# Patient Record
Sex: MALE | Race: WHITE | Employment: OTHER | URBAN - METROPOLITAN AREA
[De-identification: names, ages, dates, MRNs, and addresses within clinical notes are randomized per-mention and may not be internally consistent; named-entity substitution may affect disease eponyms.]

---

## 2017-02-01 ENCOUNTER — TELEPHONE (OUTPATIENT)
Dept: FAMILY MEDICINE CLINIC | Age: 67
End: 2017-02-01

## 2017-03-31 ENCOUNTER — TELEPHONE (OUTPATIENT)
Dept: FAMILY MEDICINE CLINIC | Age: 67
End: 2017-03-31

## 2017-11-03 LAB
AVERAGE GLUCOSE: NORMAL
HBA1C MFR BLD: 7.6 %

## 2018-01-18 ENCOUNTER — HOSPITAL ENCOUNTER (OUTPATIENT)
Dept: ULTRASOUND IMAGING | Age: 68
Discharge: HOME | End: 2018-01-18
Attending: INTERNAL MEDICINE | Admitting: INTERNAL MEDICINE

## 2018-01-18 DIAGNOSIS — R10.11 ABDOMINAL PAIN, RIGHT UPPER QUADRANT: ICD-10-CM

## 2018-01-18 DIAGNOSIS — I73.9 PERIPHERAL VASCULAR DISEASE (HCC): ICD-10-CM

## 2018-02-26 ENCOUNTER — HOSPITAL ENCOUNTER (OUTPATIENT)
Dept: SURGERY | Age: 68
Discharge: HOME | End: 2018-02-26
Attending: OPHTHALMOLOGY | Admitting: OPHTHALMOLOGY

## 2018-02-26 VITALS
DIASTOLIC BLOOD PRESSURE: 100 MMHG | OXYGEN SATURATION: 97 % | SYSTOLIC BLOOD PRESSURE: 187 MMHG | BODY MASS INDEX: 42.84 KG/M2 | HEART RATE: 76 BPM | HEIGHT: 71 IN | TEMPERATURE: 98.4 F | RESPIRATION RATE: 16 BRPM | WEIGHT: 306 LBS

## 2018-02-26 DIAGNOSIS — H25.812 COMBINED FORMS OF AGE-RELATED CATARACT OF LEFT EYE: ICD-10-CM

## 2018-02-26 LAB
GLUCOSE BLD-MCNC: 223 MG/DL (ref 70–99)
PERFORMED ON: ABNORMAL

## 2018-02-26 RX ORDER — LABETALOL HYDROCHLORIDE 5 MG/ML
5 INJECTION, SOLUTION INTRAVENOUS EVERY 10 MIN PRN
Status: DISCONTINUED | OUTPATIENT
Start: 2018-02-26 | End: 2018-02-27 | Stop reason: HOSPADM

## 2018-02-26 RX ORDER — HYDRALAZINE HYDROCHLORIDE 20 MG/ML
5 INJECTION INTRAMUSCULAR; INTRAVENOUS EVERY 10 MIN PRN
Status: DISCONTINUED | OUTPATIENT
Start: 2018-02-26 | End: 2018-02-27 | Stop reason: HOSPADM

## 2018-02-26 RX ORDER — PROMETHAZINE HYDROCHLORIDE 25 MG/ML
6.25 INJECTION, SOLUTION INTRAMUSCULAR; INTRAVENOUS
Status: ACTIVE | OUTPATIENT
Start: 2018-02-26 | End: 2018-02-26

## 2018-02-26 RX ORDER — SODIUM CHLORIDE, SODIUM LACTATE, POTASSIUM CHLORIDE, CALCIUM CHLORIDE 600; 310; 30; 20 MG/100ML; MG/100ML; MG/100ML; MG/100ML
INJECTION, SOLUTION INTRAVENOUS CONTINUOUS
Status: DISCONTINUED | OUTPATIENT
Start: 2018-02-26 | End: 2018-02-27 | Stop reason: HOSPADM

## 2018-02-26 RX ORDER — SODIUM CHLORIDE 0.9 % (FLUSH) 0.9 %
10 SYRINGE (ML) INJECTION EVERY 12 HOURS SCHEDULED
Status: DISCONTINUED | OUTPATIENT
Start: 2018-02-26 | End: 2018-02-27 | Stop reason: HOSPADM

## 2018-02-26 RX ORDER — METOCLOPRAMIDE HYDROCHLORIDE 5 MG/ML
10 INJECTION INTRAMUSCULAR; INTRAVENOUS
Status: ACTIVE | OUTPATIENT
Start: 2018-02-26 | End: 2018-02-26

## 2018-02-26 RX ORDER — SODIUM CHLORIDE 0.9 % (FLUSH) 0.9 %
10 SYRINGE (ML) INJECTION PRN
Status: DISCONTINUED | OUTPATIENT
Start: 2018-02-26 | End: 2018-02-27 | Stop reason: HOSPADM

## 2018-02-26 RX ORDER — MEPERIDINE HYDROCHLORIDE 50 MG/ML
12.5 INJECTION INTRAMUSCULAR; INTRAVENOUS; SUBCUTANEOUS EVERY 5 MIN PRN
Status: DISCONTINUED | OUTPATIENT
Start: 2018-02-26 | End: 2018-02-27 | Stop reason: HOSPADM

## 2018-02-26 RX ORDER — MORPHINE SULFATE 2 MG/ML
1 INJECTION, SOLUTION INTRAMUSCULAR; INTRAVENOUS EVERY 5 MIN PRN
Status: DISCONTINUED | OUTPATIENT
Start: 2018-02-26 | End: 2018-02-27 | Stop reason: HOSPADM

## 2018-02-26 RX ORDER — OXYCODONE HYDROCHLORIDE 5 MG/1
10 TABLET ORAL PRN
Status: ACTIVE | OUTPATIENT
Start: 2018-02-26 | End: 2018-02-26

## 2018-02-26 RX ORDER — OXYCODONE HYDROCHLORIDE 5 MG/1
5 TABLET ORAL PRN
Status: ACTIVE | OUTPATIENT
Start: 2018-02-26 | End: 2018-02-26

## 2018-02-26 RX ORDER — LIDOCAINE HYDROCHLORIDE 10 MG/ML
0.3 INJECTION, SOLUTION EPIDURAL; INFILTRATION; INTRACAUDAL; PERINEURAL
Status: ACTIVE | OUTPATIENT
Start: 2018-02-26 | End: 2018-02-26

## 2018-02-26 RX ORDER — DIPHENHYDRAMINE HYDROCHLORIDE 50 MG/ML
12.5 INJECTION INTRAMUSCULAR; INTRAVENOUS
Status: ACTIVE | OUTPATIENT
Start: 2018-02-26 | End: 2018-02-26

## 2018-02-26 RX ADMIN — SODIUM CHLORIDE, SODIUM LACTATE, POTASSIUM CHLORIDE, CALCIUM CHLORIDE: 600; 310; 30; 20 INJECTION, SOLUTION INTRAVENOUS at 08:16

## 2018-02-26 ASSESSMENT — PAIN - FUNCTIONAL ASSESSMENT: PAIN_FUNCTIONAL_ASSESSMENT: 0-10

## 2018-02-26 ASSESSMENT — PAIN SCALES - GENERAL: PAINLEVEL_OUTOF10: 0

## 2018-02-26 NOTE — ANESTHESIA POST-OP
Postoperative Anesthesia Note    Name:    Ximena Morris  MRN:      [id number]    Patient Vitals for the past 12 hrs:   BP Temp Pulse Resp SpO2 Height Weight   02/26/18 0922 (!) 187/100 - 76 16 97 % - -   02/26/18 0746 (!) 180/86 98.4 °F (36.9 °C) 74 16 96 % 5' 11\" (1.803 m) (!) 306 lb (138.8 kg)        LABS:    CBC  Lab Results   Component Value Date/Time    WBC 12.1 (H) 10/29/2017 03:33 PM    HGB 16.4 10/29/2017 03:33 PM    HCT 50.3 10/29/2017 03:33 PM     10/29/2017 03:33 PM     RENAL  Lab Results   Component Value Date/Time     10/29/2017 03:33 PM    K 4.1 10/29/2017 03:33 PM    CL 94 (L) 10/29/2017 03:33 PM    CO2 29 10/29/2017 03:33 PM    BUN 18 10/29/2017 03:33 PM    CREATININE 1.7 (H) 10/29/2017 03:33 PM    GLUCOSE 133 (H) 10/29/2017 03:33 PM     COAGS  Lab Results   Component Value Date/Time    PROTIME 10.7 05/15/2015 07:50 AM    INR 0.99 05/15/2015 07:50 AM    APTT 33.3 05/05/2010 12:30 PM       Intake & Output: In: 510 [P.O.:360; I.V.:150]  Out: -     Nausea & Vomiting:  No    Level of Consciousness:  Awake    Pain Assessment:  Adequate analgesia    Anesthesia Complications:  No apparent anesthetic complications    SUMMARY      Vital signs stable  OK to discharge from Stage I post anesthesia care.   Care transferred from Anesthesiology department on discharge from perioperative area

## 2018-02-26 NOTE — ANESTHESIA PRE-OP
 ALPRAZolam (XANAX) 1 MG tablet TAKE ONE TABLET BY MOUTH TWICE A DAY AS NEEDED 60 tablet 1    glipiZIDE (GLUCOTROL) 10 MG tablet Take 1 tablet by mouth 2 times daily (before meals). 120 tablet 2    carvedilol (COREG) 12.5 MG tablet Take 12.5 mg by mouth 2 times daily (with meals).  clopidogrel (PLAVIX) 75 MG tablet Take 75 mg by mouth daily.  vitamin D (CHOLECALCIFEROL) 1000 units TABS tablet Take 1,000 Units by mouth daily 2 tablets      valproic acid (DEPAKENE) 250 MG capsule Take 250 mg by mouth 4 times daily      divalproex (DEPAKOTE) 250 MG DR tablet Take 250 mg by mouth 3 times daily      allopurinol (ZYLOPRIM) 300 MG tablet Take 1 tablet by mouth daily. 90 tablet 3    [employer] Lancets Thin MISC by Does not apply route. Test daily 100 each 5    aspirin 325 MG tablet Take 325 mg by mouth daily.        Current Facility-Administered Medications   Medication Dose Route Frequency Provider Last Rate Last Dose    lactated ringers infusion   Intravenous Continuous Ela Gonzalez MD        sodium chloride flush 0.9 % injection 10 mL  10 mL Intravenous 2 times per day Ela Gonzalez MD        sodium chloride flush 0.9 % injection 10 mL  10 mL Intravenous PRN Ela Gonzalez MD        lidocaine PF 1 % injection 0.3 mL  0.3 mL Intradermal Once PRN Ela Gonzalez MD        bupivacaine 0.75%, phenylephrine 10%, tropicamide 1%, cyclopentolate 1%, moxifloxacin 0.5%, ketorolac 0.5% in lidocaine 2% jelly ophthalmic solution  0.3 mL Left Eye Q10 Min PRN Jonathan Cifuentes MD   0.3 mL at 02/26/18 0750    HYDROmorphone (DILAUDID) injection 0.25 mg  0.25 mg Intravenous Q5 Min PRN Roya Humphrey MD        HYDROmorphone (DILAUDID) injection 0.5 mg  0.5 mg Intravenous Q5 Min PRN Roya Humphrey MD        morphine (PF) injection 1 mg  1 mg Intravenous Q5 Min PRN Roya Humphrey MD        HYDROmorphone (DILAUDID) injection 0.5 mg  0.5 mg Intravenous Q5 Min PRN Roya Humphrey MD        oxyCODONE (Belen Bigness) immediate release tablet 5 mg  5 mg Oral PRN Vernawale Suazo MD        Or    oxyCODONE (ROXICODONE) immediate release tablet 10 mg  10 mg Oral PRN Aramis Suazo MD        diphenhydrAMINE (BENADRYL) injection 12.5 mg  12.5 mg Intravenous Once PRN Aidennawale Suazo MD        metoclopramide (REGLAN) injection 10 mg  10 mg Intravenous Once PRN Aramis Suazo MD        promethazine (PHENERGAN) injection 6.25 mg  6.25 mg Intravenous Once PRN Vernadine MD Gabriele        labetalol (NORMODYNE;TRANDATE) injection 5 mg  5 mg Intravenous Q10 Min PRN Vernadine Gabriele, MD        hydrALAZINE (APRESOLINE) injection 5 mg  5 mg Intravenous Q10 Min PRN Vernadine Gabriele, MD        meperidine (DEMEROL) injection 12.5 mg  12.5 mg Intravenous Q5 Min PRN Aidennadine MD Gabriele        epinephrine and lidocaine 2% PF in BSS injection (1 mL)   Intraocular Once Dirk Jalloh MD           Allergies:     Allergies   Allergen Reactions    Bactrim     Dye [Iodides]     Lisinopril        Problem List:    Patient Active Problem List   Diagnosis Code    HTN (hypertension) I10    ASHD (arteriosclerotic heart disease) I25.10    TIA (transient ischemic attack) G45.9    CKD (chronic kidney disease) N18.9    Increased BMI R63.8    WINSTON (obstructive sleep apnea) G47.33    Hyperlipidemia E78.5    PVD (peripheral vascular disease) ([facility] I73.9    Diabetes mellitus ([facility] E11.9    Diabetes mellitus ([facility] E11.9    Asthma exacerbation in COPD ([facility] J44.1, J45.901    Colon cancer ([facility] C18.9    Depression F32.9       Past Medical History:        Diagnosis Date    Abdominal aneurysm ([facility]     Arthritis     ASHD (arteriosclerotic heart disease) 9/8/2010    Asthma exacerbation in COPD ([facility] 5/7/2012    CKD (chronic kidney disease) 9/8/2010    [facility] 12/12/2013    COPD [facility]chronic obstructive pulmonary disease) [facility]     Depression 12/12/2013    Diabetes mellitus (HCC)     Hyperlipidemia     Hypertension     WINSTON (obstructive Vascular:                                        Anesthesia Plan      general     ASA 1    (68-year-old male presents for phacoemulsification with intraocular lens implant of the left eye. Plan MAC with ASA standard monitors. Questions answered. Patient agreeable with anesthetic plan.  )  Induction: intravenous. Anesthetic plan and risks discussed with patient. Plan discussed with CRNA.     Attending anesthesiologist reviewed and agrees with Pre Eval content        Alejandro García MD   2/26/2018

## 2018-02-26 NOTE — OP NOTE
[facility]    OPERATIVE NOTE    Preoperative Diagnosis: Cataract, left eye    Postoperative Diagnosis: Cataract, left eye    Procedure: Phacoemulsification with intraocular lens inplantation, left eye    Surgeon: Jyothi Garsia    Anesthesia: MAC with topical    Complications: none    Specimens: none    Indications for procedure: The patient is a 69y.o. year old with decreased vision, glare and halos around lights, and trouble with activities of daily living. Examination revealed a visually significant cataract in the left eye. Risks, benefits, and alternatives to surgery were discussed with the patient and the patient elected to proceed with phacoemulsification with lens implantation. Details of the procedure: Following informed consent, the patient was taken to the operating room and placed in the supine position. The eye was prepped and draped in the usual sterile fashion using aseptic technique for cataract surgery. Following topical tetracaine drops a side port incision was made in the temporal cornea. The eye was filled with trypan and rinsed with a pharmacy prepared epi-Shugarcaine mixture. The eye was then filled with viscoelastic and a 2.4 mm keratome blade was used to make a 3-plane clear corneal incision in the temporal cornea. The cystitome was used to make a tear in the anterior capsule and a Utrata forceps was used to make a complete curvilinear capsulorrhexis. The lens was hydrodissected and freely rotated. Phacoemulsification was performed. Irrigation/aspiration was used to remove all cortical material from the capsular bag. The eye was filled with viscoelastic and a foldable posterior chamber intraocular lens was injected into the capsular bag. Irrigation/aspiration was used to remove all excess viscoelastic. The eye was pressurized and the wounds were checked for leaks and none were found. The patient had Vigamox and Alphagan solutions placed on the eye.   The eye was covered

## 2019-01-07 ENCOUNTER — APPOINTMENT (OUTPATIENT)
Dept: GENERAL RADIOLOGY | Age: 69
End: 2019-01-07
Payer: MEDICARE

## 2019-01-07 ENCOUNTER — HOSPITAL ENCOUNTER (EMERGENCY)
Age: 69
Discharge: HOME | End: 2019-01-07
Attending: EMERGENCY MEDICINE
Payer: MEDICARE

## 2019-01-07 VITALS
OXYGEN SATURATION: 95 % | TEMPERATURE: 98 F | SYSTOLIC BLOOD PRESSURE: 188 MMHG | BODY MASS INDEX: 38.5 KG/M2 | DIASTOLIC BLOOD PRESSURE: 123 MMHG | RESPIRATION RATE: 16 BRPM | WEIGHT: 275 LBS | HEIGHT: 71 IN | HEART RATE: 89 BPM
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
## 2019-01-07 DIAGNOSIS — S61.411A LACERATION OF RIGHT HAND WITHOUT FOREIGN BODY, INITIAL ENCOUNTER: Primary | ICD-10-CM

## 2019-01-07 LAB
A/G RATIO: 1 (ref 1.1–2.2)
ALBUMIN SERPL-MCNC: 3.7 G/DL (ref 3.4–5)
ALP BLD-CCNC: 244 U/L (ref 40–129)
ALT SERPL-CCNC: 21 U/L (ref 10–40)
ANION GAP SERPL CALCULATED.3IONS-SCNC: 12 MMOL/L (ref 3–16)
AST SERPL-CCNC: 23 U/L (ref 15–37)
BASOPHILS ABSOLUTE: 0.2 K/UL (ref 0–0.2)
BASOPHILS RELATIVE PERCENT: 1.3 %
BILIRUB SERPL-MCNC: 0.5 MG/DL (ref 0–1)
BILIRUBIN URINE: NEGATIVE
BLOOD, URINE: NEGATIVE
BUN BLDV-MCNC: 22 MG/DL (ref 7–20)
CALCIUM SERPL-MCNC: 9.9 MG/DL (ref 8.3–10.6)
CASTS: ABNORMAL /LPF
CHLORIDE BLD-SCNC: 93 MMOL/L (ref 99–110)
CLARITY: CLEAR
CO2: 28 MMOL/L (ref 21–32)
COLOR: YELLOW
CREAT SERPL-MCNC: 1.8 MG/DL (ref 0.8–1.3)
EOSINOPHILS ABSOLUTE: 0.3 K/UL (ref 0–0.6)
EOSINOPHILS RELATIVE PERCENT: 2.8 %
GFR AFRICAN AMERICAN: 45
GFR NON-AFRICAN AMERICAN: 37
GLOBULIN: 3.8 G/DL
GLUCOSE BLD-MCNC: 122 MG/DL (ref 70–99)
GLUCOSE URINE: NEGATIVE MG/DL
HCT VFR BLD CALC: 48.9 % (ref 40.5–52.5)
HEMOGLOBIN: 15.9 G/DL (ref 13.5–17.5)
KETONES, URINE: NEGATIVE MG/DL
LEUKOCYTE ESTERASE, URINE: NEGATIVE
LYMPHOCYTES ABSOLUTE: 1.9 K/UL (ref 1–5.1)
LYMPHOCYTES RELATIVE PERCENT: 16.6 %
MCH RBC QN AUTO: 28.1 PG (ref 26–34)
MCHC RBC AUTO-ENTMCNC: 32.6 G/DL (ref 31–36)
MCV RBC AUTO: 86.3 FL (ref 80–100)
MICROSCOPIC EXAMINATION: YES
MONOCYTES ABSOLUTE: 0.8 K/UL (ref 0–1.3)
MONOCYTES RELATIVE PERCENT: 7.2 %
MUCUS: ABNORMAL /LPF
NEUTROPHILS ABSOLUTE: 8.4 K/UL (ref 1.7–7.7)
NEUTROPHILS RELATIVE PERCENT: 72.1 %
NITRITE, URINE: NEGATIVE
PDW BLD-RTO: 15.9 % (ref 12.4–15.4)
PH UA: 6.5
PLATELET # BLD: 250 K/UL (ref 135–450)
PMV BLD AUTO: 8.7 FL (ref 5–10.5)
POTASSIUM REFLEX MAGNESIUM: 4 MMOL/L (ref 3.5–5.1)
PROTEIN UA: 100 MG/DL
RBC # BLD: 5.67 M/UL (ref 4.2–5.9)
RBC UA: ABNORMAL /HPF (ref 0–2)
SODIUM BLD-SCNC: 133 MMOL/L (ref 136–145)
SPECIFIC GRAVITY UA: 1.02
TOTAL PROTEIN: 7.5 G/DL (ref 6.4–8.2)
TROPONIN: <0.01 NG/ML
URINE REFLEX TO CULTURE: ABNORMAL
URINE TYPE: ABNORMAL
UROBILINOGEN, URINE: 0.2 E.U./DL
WBC # BLD: 11.7 K/UL (ref 4–11)
WBC UA: ABNORMAL /HPF (ref 0–5)

## 2019-01-07 PROCEDURE — 81001 URINALYSIS AUTO W/SCOPE: CPT

## 2019-01-07 PROCEDURE — 90715 TDAP VACCINE 7 YRS/> IM: CPT | Performed by: PHYSICIAN ASSISTANT

## 2019-01-07 PROCEDURE — 73130 X-RAY EXAM OF HAND: CPT

## 2019-01-07 PROCEDURE — 6360000002 HC RX W HCPCS: Performed by: PHYSICIAN ASSISTANT

## 2019-01-07 PROCEDURE — 80053 COMPREHEN METABOLIC PANEL: CPT

## 2019-01-07 PROCEDURE — 99284 EMERGENCY DEPT VISIT MOD MDM: CPT

## 2019-01-07 PROCEDURE — 85025 COMPLETE CBC W/AUTO DIFF WBC: CPT

## 2019-01-07 PROCEDURE — 4500000024 HC ED LEVEL 4 PROCEDURE

## 2019-01-07 PROCEDURE — 90471 IMMUNIZATION ADMIN: CPT | Performed by: PHYSICIAN ASSISTANT

## 2019-01-07 PROCEDURE — 93005 ELECTROCARDIOGRAM TRACING: CPT | Performed by: PHYSICIAN ASSISTANT

## 2019-01-07 PROCEDURE — 84484 ASSAY OF TROPONIN QUANT: CPT

## 2019-01-07 RX ADMIN — TETANUS TOXOID, REDUCED DIPHTHERIA TOXOID AND ACELLULAR PERTUSSIS VACCINE, ADSORBED 0.5 ML: 5; 2.5; 8; 8; 2.5 SUSPENSION INTRAMUSCULAR at 20:15

## 2019-01-07 ASSESSMENT — PAIN SCALES - GENERAL: PAINLEVEL_OUTOF10: 4

## 2019-01-07 ASSESSMENT — PAIN DESCRIPTION - ORIENTATION: ORIENTATION: LEFT

## 2019-01-07 ASSESSMENT — PAIN DESCRIPTION - LOCATION: LOCATION: RIB CAGE

## 2019-01-07 ASSESSMENT — ENCOUNTER SYMPTOMS
RESPIRATORY NEGATIVE: 1
GASTROINTESTINAL NEGATIVE: 1

## 2019-01-07 ASSESSMENT — PAIN DESCRIPTION - ONSET: ONSET: SUDDEN

## 2019-01-07 ASSESSMENT — PAIN DESCRIPTION - FREQUENCY: FREQUENCY: CONTINUOUS

## 2019-01-07 ASSESSMENT — PAIN DESCRIPTION - PAIN TYPE: TYPE: ACUTE PAIN

## 2019-01-07 ASSESSMENT — PAIN DESCRIPTION - DESCRIPTORS: DESCRIPTORS: ACHING

## 2019-01-08 LAB
EKG ATRIAL RATE: 99 BPM
EKG DIAGNOSIS: NORMAL
EKG P AXIS: 68 DEGREES
EKG P-R INTERVAL: 182 MS
EKG Q-T INTERVAL: 388 MS
EKG QRS DURATION: 116 MS
EKG QTC CALCULATION (BAZETT): 497 MS
EKG R AXIS: 92 DEGREES
EKG T AXIS: 54 DEGREES
EKG VENTRICULAR RATE: 99 BPM

## 2019-01-08 PROCEDURE — 93010 ELECTROCARDIOGRAM REPORT: CPT | Performed by: INTERNAL MEDICINE

## 2019-02-06 ENCOUNTER — HOSPITAL ENCOUNTER (EMERGENCY)
Age: 69
Discharge: HOME | End: 2019-02-06
Payer: MEDICARE

## 2019-02-06 VITALS
HEART RATE: 84 BPM | OXYGEN SATURATION: 94 % | SYSTOLIC BLOOD PRESSURE: 189 MMHG | TEMPERATURE: 97 F | RESPIRATION RATE: 16 BRPM | HEIGHT: 71 IN | BODY MASS INDEX: 41.44 KG/M2 | WEIGHT: 296 LBS | DIASTOLIC BLOOD PRESSURE: 100 MMHG

## 2019-02-06 DIAGNOSIS — L03.90 CELLULITIS, UNSPECIFIED CELLULITIS SITE: Primary | ICD-10-CM

## 2019-02-06 PROCEDURE — 6370000000 HC RX 637 (ALT 250 FOR IP): Performed by: PHYSICIAN ASSISTANT

## 2019-02-06 PROCEDURE — 99283 EMERGENCY DEPT VISIT LOW MDM: CPT

## 2019-02-06 RX ORDER — CEPHALEXIN 500 MG/1
500 CAPSULE ORAL ONCE
Status: COMPLETED | OUTPATIENT
Start: 2019-02-06 | End: 2019-02-06

## 2019-02-06 RX ORDER — CEPHALEXIN 500 MG/1
500 CAPSULE ORAL 4 TIMES DAILY
Qty: 40 CAPSULE | Refills: 0 | Status: SHIPPED | OUTPATIENT
Start: 2019-02-06 | End: 2019-02-16

## 2019-02-06 RX ORDER — CEPHALEXIN 500 MG/1
500 CAPSULE ORAL 4 TIMES DAILY
Qty: 40 CAPSULE | Refills: 0 | Status: SHIPPED | OUTPATIENT
Start: 2019-02-06 | End: 2019-02-06

## 2019-02-06 RX ADMIN — CEPHALEXIN 500 MG: 500 CAPSULE ORAL at 16:24

## 2019-02-06 ASSESSMENT — PAIN DESCRIPTION - ORIENTATION: ORIENTATION: RIGHT

## 2019-02-06 ASSESSMENT — PAIN SCALES - GENERAL: PAINLEVEL_OUTOF10: 7

## 2019-02-06 ASSESSMENT — PAIN DESCRIPTION - LOCATION: LOCATION: HAND

## 2019-02-27 ENCOUNTER — OFFICE VISIT (OUTPATIENT)
Dept: FAMILY MEDICINE CLINIC | Age: 69
End: 2019-02-27
Payer: MEDICARE

## 2019-02-27 VITALS — WEIGHT: 282 LBS | BODY MASS INDEX: 39.48 KG/M2 | HEIGHT: 71 IN | RESPIRATION RATE: 16 BRPM

## 2019-02-27 DIAGNOSIS — F51.01 PRIMARY INSOMNIA: ICD-10-CM

## 2019-02-27 DIAGNOSIS — N18.30 CHRONIC KIDNEY DISEASE, STAGE III (MODERATE) (HCC): ICD-10-CM

## 2019-02-27 DIAGNOSIS — I10 ESSENTIAL HYPERTENSION: Primary | ICD-10-CM

## 2019-02-27 DIAGNOSIS — Z87.39 HX OF GOUT: ICD-10-CM
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
## 2019-02-27 DIAGNOSIS — N18.30 STAGE 3 CHRONIC KIDNEY DISEASE (HCC): ICD-10-CM

## 2019-02-27 DIAGNOSIS — E11.21 TYPE 2 DIABETES MELLITUS WITH DIABETIC NEPHROPATHY, WITHOUT LONG-TERM CURRENT USE OF INSULIN (HCC): ICD-10-CM

## 2019-02-27 DIAGNOSIS — Z85.038 HISTORY OF COLON CANCER: ICD-10-CM

## 2019-02-27 DIAGNOSIS — F32.A DEPRESSION, UNSPECIFIED DEPRESSION TYPE: ICD-10-CM

## 2019-02-27 PROBLEM — E66.01 CLASS 2 SEVERE OBESITY DUE TO EXCESS CALORIES WITH SERIOUS COMORBIDITY AND BODY MASS INDEX (BMI) OF 39.0 TO 39.9 IN ADULT (HCC): Status: ACTIVE | Noted: 2019-02-27

## 2019-02-27 LAB
ANION GAP SERPL CALCULATED.3IONS-SCNC: 18 MMOL/L (ref 3–16)
BUN BLDV-MCNC: 19 MG/DL (ref 7–20)
CALCIUM SERPL-MCNC: 9.4 MG/DL (ref 8.3–10.6)
CHLORIDE BLD-SCNC: 98 MMOL/L (ref 99–110)
CO2: 25 MMOL/L (ref 21–32)
CREAT SERPL-MCNC: 1.7 MG/DL (ref 0.8–1.3)
GFR AFRICAN AMERICAN: 48
GFR NON-AFRICAN AMERICAN: 40
GLUCOSE BLD-MCNC: 104 MG/DL (ref 70–99)
POTASSIUM SERPL-SCNC: 4.3 MMOL/L (ref 3.5–5.1)
SODIUM BLD-SCNC: 141 MMOL/L (ref 136–145)

## 2019-02-27 PROCEDURE — 99205 OFFICE O/P NEW HI 60 MIN: CPT | Performed by: FAMILY MEDICINE

## 2019-02-27 PROCEDURE — G8427 DOCREV CUR MEDS BY ELIG CLIN: HCPCS | Performed by: FAMILY MEDICINE

## 2019-02-27 PROCEDURE — 1101F PT FALLS ASSESS-DOCD LE1/YR: CPT | Performed by: FAMILY MEDICINE

## 2019-02-27 PROCEDURE — 3046F HEMOGLOBIN A1C LEVEL >9.0%: CPT | Performed by: FAMILY MEDICINE

## 2019-02-27 PROCEDURE — G8484 FLU IMMUNIZE NO ADMIN: HCPCS | Performed by: FAMILY MEDICINE

## 2019-02-27 PROCEDURE — G8598 ASA/ANTIPLAT THER USED: HCPCS | Performed by: FAMILY MEDICINE

## 2019-02-27 PROCEDURE — 3017F COLORECTAL CA SCREEN DOC REV: CPT | Performed by: FAMILY MEDICINE

## 2019-02-27 PROCEDURE — G8417 CALC BMI ABV UP PARAM F/U: HCPCS | Performed by: FAMILY MEDICINE

## 2019-02-27 PROCEDURE — 2022F DILAT RTA XM EVC RTNOPTHY: CPT | Performed by: FAMILY MEDICINE

## 2019-02-27 PROCEDURE — 1123F ACP DISCUSS/DSCN MKR DOCD: CPT | Performed by: FAMILY MEDICINE

## 2019-02-27 PROCEDURE — 1036F TOBACCO NON-USER: CPT | Performed by: FAMILY MEDICINE

## 2019-02-27 PROCEDURE — 4040F PNEUMOC VAC/ADMIN/RCVD: CPT | Performed by: FAMILY MEDICINE

## 2019-02-27 PROCEDURE — 36415 COLL VENOUS BLD VENIPUNCTURE: CPT | Performed by: FAMILY MEDICINE

## 2019-02-27 RX ORDER — LAMOTRIGINE 25 MG/1
25 TABLET ORAL DAILY
COMMUNITY

## 2019-02-27 RX ORDER — HYDROCHLOROTHIAZIDE 25 MG/1
25 TABLET ORAL EVERY MORNING
Qty: 90 TABLET | Refills: 1 | Status: SHIPPED | OUTPATIENT
Start: 2019-02-27

## 2019-02-27 ASSESSMENT — PATIENT HEALTH QUESTIONNAIRE - PHQ9
SUM OF ALL RESPONSES TO PHQ QUESTIONS 1-9: 2
SUM OF ALL RESPONSES TO PHQ QUESTIONS 1-9: 2
SUM OF ALL RESPONSES TO PHQ9 QUESTIONS 1 & 2: 2
1. LITTLE INTEREST OR PLEASURE IN DOING THINGS: 1
2. FEELING DOWN, DEPRESSED OR HOPELESS: 1

## 2019-02-27 ASSESSMENT — ENCOUNTER SYMPTOMS: SHORTNESS OF BREATH: 0

## 2019-02-28 LAB
ESTIMATED AVERAGE GLUCOSE: 137 MG/DL
HBA1C MFR BLD: 6.4 %

## 2019-07-15 ENCOUNTER — OFFICE VISIT (OUTPATIENT)
Dept: PULMONOLOGY | Age: 69
End: 2019-07-15
Payer: OTHER GOVERNMENT

## 2019-07-15 VITALS
BODY MASS INDEX: 34.3 KG/M2 | DIASTOLIC BLOOD PRESSURE: 82 MMHG | WEIGHT: 245 LBS | SYSTOLIC BLOOD PRESSURE: 122 MMHG | HEART RATE: 90 BPM | TEMPERATURE: 98.2 F | OXYGEN SATURATION: 98 % | HEIGHT: 71 IN | RESPIRATION RATE: 16 BRPM

## 2019-07-15 DIAGNOSIS — E66.01 MORBID OBESITY (HCC): ICD-10-CM

## 2019-07-15 DIAGNOSIS — G47.33 OSA (OBSTRUCTIVE SLEEP APNEA): Primary | ICD-10-CM

## 2019-07-15 DIAGNOSIS — G47.10 HYPERSOMNIA: ICD-10-CM

## 2019-07-15 DIAGNOSIS — R06.83 SNORING: ICD-10-CM

## 2019-07-15 PROCEDURE — 99204 OFFICE O/P NEW MOD 45 MIN: CPT | Performed by: INTERNAL MEDICINE

## 2019-07-15 ASSESSMENT — SLEEP AND FATIGUE QUESTIONNAIRES
HOW LIKELY ARE YOU TO NOD OFF OR FALL ASLEEP WHILE LYING DOWN TO REST IN THE AFTERNOON WHEN CIRCUMSTANCES PERMIT: 0
ESS TOTAL SCORE: 0
HOW LIKELY ARE YOU TO NOD OFF OR FALL ASLEEP WHILE WATCHING TV: 0
HOW LIKELY ARE YOU TO NOD OFF OR FALL ASLEEP WHILE SITTING QUIETLY AFTER LUNCH WITHOUT ALCOHOL: 0
HOW LIKELY ARE YOU TO NOD OFF OR FALL ASLEEP IN A CAR, WHILE STOPPED FOR A FEW MINUTES IN TRAFFIC: 0
HOW LIKELY ARE YOU TO NOD OFF OR FALL ASLEEP WHILE SITTING INACTIVE IN A PUBLIC PLACE: 0
NECK CIRCUMFERENCE (INCHES): 19
HOW LIKELY ARE YOU TO NOD OFF OR FALL ASLEEP WHEN YOU ARE A PASSENGER IN A CAR FOR AN HOUR WITHOUT A BREAK: 0
HOW LIKELY ARE YOU TO NOD OFF OR FALL ASLEEP WHILE SITTING AND READING: 0
HOW LIKELY ARE YOU TO NOD OFF OR FALL ASLEEP WHILE SITTING AND TALKING TO SOMEONE: 0

## 2019-07-15 NOTE — LETTER
[facility]  [address]  [address]  Phone: [phone]  Fax: [phone]    July 16, 2019     Patient: Chikis Tapia   MR Number: [id number]   YOB: 1950   Date of Visit: 7/15/2019     Dear Dr. Donte Gomez: Thank you for the request for consultation for Rosa Maria Leavitt to me for the evaluation. Below are the relevant portions of my assessment and plan of care. Assessment:       · Mild WINSTON. Was doing well on CPAP 14 cm H2O. Lost his machine. · Snoring, Observed sleep apnea and Hypersomnia -worse since off CPAP  · Morbid obesity  · Hypertension    Plan:  ·   Order for CPAP 14 cm H2O   · Obtain old records   · Full night CPAP/BiPAP titration fails to improve  · Advised to use CPAP 6-8 hrs at night and during naps. · Replacement of mask, tubing, head straps every 3-6 months or sooner if damaged. · Follow up CPAP compliance and pressure adjustment if needed  · Sleep hygiene  · Avoid sedatives, alcohol and caffeinated drinks at bed time. · No driving motorized vehicles or operating heavy machinery while fatigue, drowsy or sleepy. · Weight loss is also recommended as a long-term intervention. · Treatment of WINSTON can lower blood pressure by levels that are clinically significant. · Complications of WINSTON if not treated were discussed with patient patient to include systemic hypertension, pulmonary hypertension, cardiovascular morbidities, car accidents and all cause mortality. If you have questions, please do not hesitate to call me. I look forward to following Iliana Laboy along with you.     Sincerely,    Nitish Tony MD

## 2019-07-15 NOTE — PATIENT INSTRUCTIONS
.Please keep all of your future appointments scheduled by [facility] office. Out of respect for other patients and providers, you may be asked to reschedule your appointment if you arrive later than your scheduled appointment time. Appointments cancelled less than 24hrs in advance will be considered a no show. Patients with three missed appointments within 1 year or four missed appointments within 2 years can be dismissed from the practice. You may receive a survey regarding the care you received during your visit. Your input is valuable to us. We encourage you to complete and return your survey. We hope you will choose us in the future for your healthcare needs. Here are some tips to to getting better sleep  1- Avoid napping during the day: This will ensure you are tired at bedtime. If you have to take a nap, sleep less than one hour, before 3 pm.   2- Exercise regularly, but not right before bed: but the timing of the workout is important. Exercising in the morning or early afternoon will not interfere with sleep. Exercising within two hours before bedtime can decrease your ability to fall asleep. Regular exercise is recommended to help you deepen the sleep. 3- Avoid heavy, spicy, or sugary foods 4-6 hours before bedtime: These can affect your ability to stay asleep. 4- Have a light snack before bed: Having an empty stomach can interfere with your sleep. Dairy products and turkey contain tryptophan, which acts as a natural sleep inducer. 5- Stay away from caffeine, nicotine and alcohol at least 4-6 hours before bed: Caffeine and nicotine are stimulants that interfere with your ability to fall asleep. While alcohol has an immediate sleep-inducing effect, a few hours later, as alcohol levels in your blood start to fall, there is a stimulant effect and you will experience fragmented sleep.    6- Take a hot bath 90 minutes before bedtime:  A hot bath will raise your body temperature, but it is the drop in body temperature that may leave you feeling sleepy  7- Develop sleep rituals: it is important to give your body cues that it is time to slow down and sleep. Listen to relaxing music, read something soothing for 15 minutes, have a cup of caffeine free tea, or do relaxation exercises such as yoga or deep breathing help relieve anxiety and reduce muscle tension. 8- Fix a bedtime and an awakening time: Even on weekends! When your sleep cycle has a regular rhythm, you will feel better. 9- Sleep only when sleepy: This reduces the time you are awake in bed. 10- Get into your favorite sleeping position: If you can't fall asleep within 15-30 minutes, get up and do something boring until you feel sleepy. Sit quietly in the dark or read the warranty on your refrigerator. Don't expose yourself to bright light while you are up, it gives cues to your brain that it is time to wake up. 11- Only use your bed for sleeping: Dont use the bed as an office, workroom or recreation room. Let your body \"know\" that the bed is associated with sleeping  12- Use comfortable bedding. Uncomfortable bedding can prevent good sleep. Evaluate whether or not this is a source of your problem, and make appropriate changes. 13- Make sure your bed and bedroom are quiet and comfortable: A hot room can be uncomfortable. A cooler room, along with enough blankets to stay warm is recommended. Get a blackout shade or wear a slumber mask and wear earplugs or get a \"white noise\" machine for light and noise distractions. 14- Use sunlight to set your biological clock: When you get up in the morning, go outside and turn your face to the sun for 15 minutes. 13- Dont take your worries to bed: Leave worries about job, school, daily life, etc., behind when you go to bed. Some people find it useful to assign a \"worry period\" during the evening or afternoon for these issues.

## 2019-07-19 ENCOUNTER — TELEPHONE (OUTPATIENT)
Dept: PULMONOLOGY | Age: 69
End: 2019-07-19

## 2019-08-03 ENCOUNTER — HOSPITAL ENCOUNTER (INPATIENT)
Age: 69
LOS: 4 days | Discharge: HOME HEALTH | DRG: 443 | End: 2019-08-07
Attending: INTERNAL MEDICINE | Admitting: INTERNAL MEDICINE
Payer: MEDICARE

## 2019-08-03 ENCOUNTER — APPOINTMENT (OUTPATIENT)
Dept: CT IMAGING | Age: 69
DRG: 443 | End: 2019-08-03
Payer: MEDICARE

## 2019-08-03 ENCOUNTER — APPOINTMENT (OUTPATIENT)
Dept: GENERAL RADIOLOGY | Age: 69
DRG: 443 | End: 2019-08-03
Payer: MEDICARE

## 2019-08-03 DIAGNOSIS — R11.2 INTRACTABLE VOMITING WITH NAUSEA, UNSPECIFIED VOMITING TYPE: Primary | ICD-10-CM

## 2019-08-03 LAB
A/G RATIO: 0.8 (ref 1.1–2.2)
ALBUMIN SERPL-MCNC: 3.6 G/DL (ref 3.4–5)
ALP BLD-CCNC: 390 U/L (ref 40–129)
ALT SERPL-CCNC: 15 U/L (ref 10–40)
ANION GAP SERPL CALCULATED.3IONS-SCNC: 17 MMOL/L (ref 3–16)
AST SERPL-CCNC: 27 U/L (ref 15–37)
BACTERIA: ABNORMAL /HPF
BASOPHILS ABSOLUTE: 0.1 K/UL (ref 0–0.2)
BASOPHILS RELATIVE PERCENT: 0.8 %
BILIRUB SERPL-MCNC: 1.1 MG/DL (ref 0–1)
BILIRUBIN URINE: ABNORMAL
BLOOD, URINE: NEGATIVE
BUN BLDV-MCNC: 27 MG/DL (ref 7–20)
CALCIUM SERPL-MCNC: 9.8 MG/DL (ref 8.3–10.6)
CHLORIDE BLD-SCNC: 95 MMOL/L (ref 99–110)
CLARITY: CLEAR
CO2: 27 MMOL/L (ref 21–32)
COLOR: YELLOW
CREAT SERPL-MCNC: 1.9 MG/DL (ref 0.8–1.3)
EKG ATRIAL RATE: 82 BPM
EKG DIAGNOSIS: NORMAL
EKG P-R INTERVAL: 158 MS
EKG Q-T INTERVAL: 404 MS
EKG QRS DURATION: 112 MS
EKG QTC CALCULATION (BAZETT): 472 MS
EKG R AXIS: 117 DEGREES
EKG T AXIS: 71 DEGREES
EKG VENTRICULAR RATE: 82 BPM
EOSINOPHILS ABSOLUTE: 0.1 K/UL (ref 0–0.6)
EOSINOPHILS RELATIVE PERCENT: 1 %
EPITHELIAL CELLS, UA: ABNORMAL /HPF
GFR AFRICAN AMERICAN: 42
GFR NON-AFRICAN AMERICAN: 35
GLOBULIN: 4.5 G/DL
GLUCOSE BLD-MCNC: 100 MG/DL (ref 70–99)
GLUCOSE BLD-MCNC: 115 MG/DL (ref 70–99)
GLUCOSE BLD-MCNC: 146 MG/DL (ref 70–99)
GLUCOSE URINE: NEGATIVE MG/DL
HCT VFR BLD CALC: 51.7 % (ref 40.5–52.5)
HEMOGLOBIN: 17 G/DL (ref 13.5–17.5)
KETONES, URINE: NEGATIVE MG/DL
LEUKOCYTE ESTERASE, URINE: NEGATIVE
LIPASE: 44 U/L (ref 13–60)
LYMPHOCYTES ABSOLUTE: 1.4 K/UL (ref 1–5.1)
LYMPHOCYTES RELATIVE PERCENT: 11.7 %
MCH RBC QN AUTO: 28.5 PG (ref 26–34)
MCHC RBC AUTO-ENTMCNC: 32.9 G/DL (ref 31–36)
MCV RBC AUTO: 86.7 FL (ref 80–100)
MICROSCOPIC EXAMINATION: YES
MONOCYTES ABSOLUTE: 0.7 K/UL (ref 0–1.3)
MONOCYTES RELATIVE PERCENT: 6.1 %
MUCUS: ABNORMAL /LPF
NEUTROPHILS ABSOLUTE: 9.7 K/UL (ref 1.7–7.7)
NEUTROPHILS RELATIVE PERCENT: 80.4 %
NITRITE, URINE: NEGATIVE
PDW BLD-RTO: 16 % (ref 12.4–15.4)
PERFORMED ON: ABNORMAL
PERFORMED ON: ABNORMAL
PH UA: 6 (ref 5–8)
PLATELET # BLD: 280 K/UL (ref 135–450)
PMV BLD AUTO: 8.5 FL (ref 5–10.5)
POTASSIUM REFLEX MAGNESIUM: 3.7 MMOL/L (ref 3.5–5.1)
PRO-BNP: 4026 PG/ML (ref 0–124)
PROTEIN UA: 30 MG/DL
RBC # BLD: 5.96 M/UL (ref 4.2–5.9)
RBC UA: ABNORMAL /HPF (ref 0–2)
SODIUM BLD-SCNC: 139 MMOL/L (ref 136–145)
SPECIFIC GRAVITY UA: 1.01 (ref 1–1.03)
TOTAL PROTEIN: 8.1 G/DL (ref 6.4–8.2)
TROPONIN: <0.01 NG/ML
URINE REFLEX TO CULTURE: ABNORMAL
URINE TYPE: ABNORMAL
UROBILINOGEN, URINE: 1 E.U./DL
WBC # BLD: 12 K/UL (ref 4–11)
WBC UA: ABNORMAL /HPF (ref 0–5)

## 2019-08-03 PROCEDURE — 96372 THER/PROPH/DIAG INJ SC/IM: CPT

## 2019-08-03 PROCEDURE — 94761 N-INVAS EAR/PLS OXIMETRY MLT: CPT

## 2019-08-03 PROCEDURE — 84484 ASSAY OF TROPONIN QUANT: CPT

## 2019-08-03 PROCEDURE — 93010 ELECTROCARDIOGRAM REPORT: CPT | Performed by: INTERNAL MEDICINE

## 2019-08-03 PROCEDURE — 96375 TX/PRO/DX INJ NEW DRUG ADDON: CPT

## 2019-08-03 PROCEDURE — C9113 INJ PANTOPRAZOLE SODIUM, VIA: HCPCS | Performed by: INTERNAL MEDICINE

## 2019-08-03 PROCEDURE — 86301 IMMUNOASSAY TUMOR CA 19-9: CPT

## 2019-08-03 PROCEDURE — 93005 ELECTROCARDIOGRAM TRACING: CPT | Performed by: NURSE PRACTITIONER

## 2019-08-03 PROCEDURE — 6360000002 HC RX W HCPCS: Performed by: NURSE PRACTITIONER

## 2019-08-03 PROCEDURE — 2580000003 HC RX 258: Performed by: INTERNAL MEDICINE

## 2019-08-03 PROCEDURE — 2700000000 HC OXYGEN THERAPY PER DAY

## 2019-08-03 PROCEDURE — 2580000003 HC RX 258: Performed by: NURSE PRACTITIONER

## 2019-08-03 PROCEDURE — 94660 CPAP INITIATION&MGMT: CPT

## 2019-08-03 PROCEDURE — 82378 CARCINOEMBRYONIC ANTIGEN: CPT

## 2019-08-03 PROCEDURE — 96361 HYDRATE IV INFUSION ADD-ON: CPT

## 2019-08-03 PROCEDURE — 36415 COLL VENOUS BLD VENIPUNCTURE: CPT

## 2019-08-03 PROCEDURE — 96376 TX/PRO/DX INJ SAME DRUG ADON: CPT

## 2019-08-03 PROCEDURE — 6360000002 HC RX W HCPCS: Performed by: INTERNAL MEDICINE

## 2019-08-03 PROCEDURE — 1200000000 HC SEMI PRIVATE

## 2019-08-03 PROCEDURE — 6370000000 HC RX 637 (ALT 250 FOR IP): Performed by: INTERNAL MEDICINE

## 2019-08-03 PROCEDURE — 85025 COMPLETE CBC W/AUTO DIFF WBC: CPT

## 2019-08-03 PROCEDURE — 83880 ASSAY OF NATRIURETIC PEPTIDE: CPT

## 2019-08-03 PROCEDURE — 71046 X-RAY EXAM CHEST 2 VIEWS: CPT

## 2019-08-03 PROCEDURE — 83690 ASSAY OF LIPASE: CPT

## 2019-08-03 PROCEDURE — 99285 EMERGENCY DEPT VISIT HI MDM: CPT

## 2019-08-03 PROCEDURE — 96374 THER/PROPH/DIAG INJ IV PUSH: CPT

## 2019-08-03 PROCEDURE — 81001 URINALYSIS AUTO W/SCOPE: CPT

## 2019-08-03 PROCEDURE — 74176 CT ABD & PELVIS W/O CONTRAST: CPT

## 2019-08-03 PROCEDURE — 80053 COMPREHEN METABOLIC PANEL: CPT

## 2019-08-03 RX ORDER — ONDANSETRON 2 MG/ML
4 INJECTION INTRAMUSCULAR; INTRAVENOUS ONCE
Status: COMPLETED | OUTPATIENT
Start: 2019-08-03 | End: 2019-08-03

## 2019-08-03 RX ORDER — ONDANSETRON 2 MG/ML
4 INJECTION INTRAMUSCULAR; INTRAVENOUS EVERY 6 HOURS PRN
Status: DISCONTINUED | OUTPATIENT
Start: 2019-08-03 | End: 2019-08-07 | Stop reason: HOSPADM

## 2019-08-03 RX ORDER — LAMOTRIGINE 25 MG/1
25 TABLET ORAL DAILY
Status: DISCONTINUED | OUTPATIENT
Start: 2019-08-03 | End: 2019-08-07 | Stop reason: HOSPADM

## 2019-08-03 RX ORDER — PANTOPRAZOLE SODIUM 40 MG/10ML
40 INJECTION, POWDER, LYOPHILIZED, FOR SOLUTION INTRAVENOUS DAILY
Status: DISCONTINUED | OUTPATIENT
Start: 2019-08-03 | End: 2019-08-06

## 2019-08-03 RX ORDER — SODIUM CHLORIDE 9 MG/ML
INJECTION, SOLUTION INTRAVENOUS CONTINUOUS
Status: DISCONTINUED | OUTPATIENT
Start: 2019-08-03 | End: 2019-08-06

## 2019-08-03 RX ORDER — SODIUM CHLORIDE 0.9 % (FLUSH) 0.9 %
10 SYRINGE (ML) INJECTION EVERY 12 HOURS SCHEDULED
Status: DISCONTINUED | OUTPATIENT
Start: 2019-08-03 | End: 2019-08-07 | Stop reason: HOSPADM

## 2019-08-03 RX ORDER — 0.9 % SODIUM CHLORIDE 0.9 %
1000 INTRAVENOUS SOLUTION INTRAVENOUS ONCE
Status: COMPLETED | OUTPATIENT
Start: 2019-08-03 | End: 2019-08-03

## 2019-08-03 RX ORDER — HYDRALAZINE HYDROCHLORIDE 20 MG/ML
10 INJECTION INTRAMUSCULAR; INTRAVENOUS EVERY 6 HOURS PRN
Status: DISCONTINUED | OUTPATIENT
Start: 2019-08-03 | End: 2019-08-07 | Stop reason: HOSPADM

## 2019-08-03 RX ORDER — MORPHINE SULFATE 4 MG/ML
2 INJECTION, SOLUTION INTRAMUSCULAR; INTRAVENOUS EVERY 4 HOURS PRN
Status: DISCONTINUED | OUTPATIENT
Start: 2019-08-03 | End: 2019-08-07 | Stop reason: HOSPADM

## 2019-08-03 RX ORDER — SODIUM CHLORIDE 0.9 % (FLUSH) 0.9 %
10 SYRINGE (ML) INJECTION PRN
Status: DISCONTINUED | OUTPATIENT
Start: 2019-08-03 | End: 2019-08-07 | Stop reason: HOSPADM

## 2019-08-03 RX ORDER — ALPRAZOLAM 0.5 MG/1
0.5 TABLET ORAL 2 TIMES DAILY PRN
Status: DISCONTINUED | OUTPATIENT
Start: 2019-08-03 | End: 2019-08-07 | Stop reason: HOSPADM

## 2019-08-03 RX ADMIN — SODIUM CHLORIDE 1000 ML: 9 INJECTION, SOLUTION INTRAVENOUS at 13:18

## 2019-08-03 RX ADMIN — ALPRAZOLAM 0.5 MG: 0.5 TABLET ORAL at 16:59

## 2019-08-03 RX ADMIN — LAMOTRIGINE 25 MG: 25 TABLET ORAL at 16:59

## 2019-08-03 RX ADMIN — SODIUM CHLORIDE: 9 INJECTION, SOLUTION INTRAVENOUS at 16:59

## 2019-08-03 RX ADMIN — ONDANSETRON 4 MG: 2 INJECTION INTRAMUSCULAR; INTRAVENOUS at 13:18

## 2019-08-03 RX ADMIN — ENOXAPARIN SODIUM 40 MG: 40 INJECTION SUBCUTANEOUS at 16:59

## 2019-08-03 RX ADMIN — ONDANSETRON 4 MG: 2 INJECTION INTRAMUSCULAR; INTRAVENOUS at 15:43

## 2019-08-03 RX ADMIN — SODIUM CHLORIDE: 9 INJECTION, SOLUTION INTRAVENOUS at 19:51

## 2019-08-03 RX ADMIN — MAGNESIUM HYDROXIDE 30 ML: 400 SUSPENSION ORAL at 18:51

## 2019-08-03 RX ADMIN — PANTOPRAZOLE SODIUM 40 MG: 40 INJECTION, POWDER, LYOPHILIZED, FOR SOLUTION INTRAVENOUS at 17:00

## 2019-08-03 ASSESSMENT — ENCOUNTER SYMPTOMS
ABDOMINAL PAIN: 0
CHOKING: 0
VOMITING: 1
TROUBLE SWALLOWING: 0
SORE THROAT: 0
SHORTNESS OF BREATH: 0
NAUSEA: 1
COUGH: 0
BACK PAIN: 0

## 2019-08-03 ASSESSMENT — PATIENT HEALTH QUESTIONNAIRE - PHQ9: SUM OF ALL RESPONSES TO PHQ QUESTIONS 1-9: 18

## 2019-08-04 ENCOUNTER — APPOINTMENT (OUTPATIENT)
Dept: CT IMAGING | Age: 69
DRG: 443 | End: 2019-08-04
Payer: MEDICARE

## 2019-08-04 LAB
A/G RATIO: 0.8 (ref 1.1–2.2)
ALBUMIN SERPL-MCNC: 3 G/DL (ref 3.4–5)
ALP BLD-CCNC: 367 U/L (ref 40–129)
ALT SERPL-CCNC: 15 U/L (ref 10–40)
ANION GAP SERPL CALCULATED.3IONS-SCNC: 12 MMOL/L (ref 3–16)
AST SERPL-CCNC: 31 U/L (ref 15–37)
BASOPHILS ABSOLUTE: 0.1 K/UL (ref 0–0.2)
BASOPHILS RELATIVE PERCENT: 1.2 %
BILIRUB SERPL-MCNC: 0.6 MG/DL (ref 0–1)
BUN BLDV-MCNC: 25 MG/DL (ref 7–20)
CALCIUM SERPL-MCNC: 8.7 MG/DL (ref 8.3–10.6)
CEA: 5313 NG/ML (ref 0–5)
CHLORIDE BLD-SCNC: 100 MMOL/L (ref 99–110)
CO2: 30 MMOL/L (ref 21–32)
CREAT SERPL-MCNC: 1.9 MG/DL (ref 0.8–1.3)
EOSINOPHILS ABSOLUTE: 0.3 K/UL (ref 0–0.6)
EOSINOPHILS RELATIVE PERCENT: 2.8 %
GFR AFRICAN AMERICAN: 42
GFR NON-AFRICAN AMERICAN: 35
GLOBULIN: 3.7 G/DL
GLUCOSE BLD-MCNC: 101 MG/DL (ref 70–99)
GLUCOSE BLD-MCNC: 105 MG/DL (ref 70–99)
GLUCOSE BLD-MCNC: 80 MG/DL (ref 70–99)
GLUCOSE BLD-MCNC: 86 MG/DL (ref 70–99)
GLUCOSE BLD-MCNC: 95 MG/DL (ref 70–99)
HCT VFR BLD CALC: 45.7 % (ref 40.5–52.5)
HEMOGLOBIN: 14.8 G/DL (ref 13.5–17.5)
LYMPHOCYTES ABSOLUTE: 1.6 K/UL (ref 1–5.1)
LYMPHOCYTES RELATIVE PERCENT: 17.7 %
MCH RBC QN AUTO: 28.5 PG (ref 26–34)
MCHC RBC AUTO-ENTMCNC: 32.4 G/DL (ref 31–36)
MCV RBC AUTO: 87.9 FL (ref 80–100)
MONOCYTES ABSOLUTE: 0.9 K/UL (ref 0–1.3)
MONOCYTES RELATIVE PERCENT: 9.5 %
NEUTROPHILS ABSOLUTE: 6.2 K/UL (ref 1.7–7.7)
NEUTROPHILS RELATIVE PERCENT: 68.8 %
PDW BLD-RTO: 16.2 % (ref 12.4–15.4)
PERFORMED ON: ABNORMAL
PERFORMED ON: ABNORMAL
PERFORMED ON: NORMAL
PERFORMED ON: NORMAL
PLATELET # BLD: 204 K/UL (ref 135–450)
PMV BLD AUTO: 9 FL (ref 5–10.5)
POTASSIUM REFLEX MAGNESIUM: 3.8 MMOL/L (ref 3.5–5.1)
RBC # BLD: 5.2 M/UL (ref 4.2–5.9)
SODIUM BLD-SCNC: 142 MMOL/L (ref 136–145)
TOTAL PROTEIN: 6.7 G/DL (ref 6.4–8.2)
WBC # BLD: 9 K/UL (ref 4–11)

## 2019-08-04 PROCEDURE — 85025 COMPLETE CBC W/AUTO DIFF WBC: CPT

## 2019-08-04 PROCEDURE — 96376 TX/PRO/DX INJ SAME DRUG ADON: CPT

## 2019-08-04 PROCEDURE — C9113 INJ PANTOPRAZOLE SODIUM, VIA: HCPCS | Performed by: INTERNAL MEDICINE

## 2019-08-04 PROCEDURE — 6360000002 HC RX W HCPCS: Performed by: INTERNAL MEDICINE

## 2019-08-04 PROCEDURE — 36415 COLL VENOUS BLD VENIPUNCTURE: CPT

## 2019-08-04 PROCEDURE — 94660 CPAP INITIATION&MGMT: CPT

## 2019-08-04 PROCEDURE — 1200000000 HC SEMI PRIVATE

## 2019-08-04 PROCEDURE — 80053 COMPREHEN METABOLIC PANEL: CPT

## 2019-08-04 PROCEDURE — 94761 N-INVAS EAR/PLS OXIMETRY MLT: CPT

## 2019-08-04 PROCEDURE — 96375 TX/PRO/DX INJ NEW DRUG ADDON: CPT

## 2019-08-04 PROCEDURE — 6370000000 HC RX 637 (ALT 250 FOR IP): Performed by: INTERNAL MEDICINE

## 2019-08-04 PROCEDURE — 2700000000 HC OXYGEN THERAPY PER DAY

## 2019-08-04 PROCEDURE — 71250 CT THORAX DX C-: CPT

## 2019-08-04 PROCEDURE — 99232 SBSQ HOSP IP/OBS MODERATE 35: CPT | Performed by: INTERNAL MEDICINE

## 2019-08-04 PROCEDURE — 96372 THER/PROPH/DIAG INJ SC/IM: CPT

## 2019-08-04 PROCEDURE — 2580000003 HC RX 258: Performed by: INTERNAL MEDICINE

## 2019-08-04 RX ADMIN — ALPRAZOLAM 0.5 MG: 0.5 TABLET ORAL at 21:09

## 2019-08-04 RX ADMIN — PANTOPRAZOLE SODIUM 40 MG: 40 INJECTION, POWDER, LYOPHILIZED, FOR SOLUTION INTRAVENOUS at 06:53

## 2019-08-04 RX ADMIN — LAMOTRIGINE 25 MG: 25 TABLET ORAL at 09:20

## 2019-08-04 RX ADMIN — SODIUM CHLORIDE: 9 INJECTION, SOLUTION INTRAVENOUS at 14:22

## 2019-08-04 RX ADMIN — ENOXAPARIN SODIUM 40 MG: 40 INJECTION SUBCUTANEOUS at 09:20

## 2019-08-04 RX ADMIN — SODIUM CHLORIDE: 9 INJECTION, SOLUTION INTRAVENOUS at 04:37

## 2019-08-04 RX ADMIN — SODIUM CHLORIDE: 9 INJECTION, SOLUTION INTRAVENOUS at 21:09

## 2019-08-04 RX ADMIN — Medication 10 ML: at 09:20

## 2019-08-04 RX ADMIN — ALPRAZOLAM 0.5 MG: 0.5 TABLET ORAL at 14:22

## 2019-08-04 RX ADMIN — HYDRALAZINE HYDROCHLORIDE 10 MG: 20 INJECTION INTRAMUSCULAR; INTRAVENOUS at 14:21

## 2019-08-05 ENCOUNTER — APPOINTMENT (OUTPATIENT)
Dept: CT IMAGING | Age: 69
DRG: 443 | End: 2019-08-05
Payer: MEDICARE

## 2019-08-05 LAB
GLUCOSE BLD-MCNC: 104 MG/DL (ref 70–99)
GLUCOSE BLD-MCNC: 105 MG/DL (ref 70–99)
GLUCOSE BLD-MCNC: 69 MG/DL (ref 70–99)
GLUCOSE BLD-MCNC: 78 MG/DL (ref 70–99)
INR BLD: 1.07 (ref 0.86–1.14)
PERFORMED ON: ABNORMAL
PERFORMED ON: NORMAL
PROTHROMBIN TIME: 12.2 SEC (ref 9.8–13)

## 2019-08-05 PROCEDURE — 92610 EVALUATE SWALLOWING FUNCTION: CPT

## 2019-08-05 PROCEDURE — 6360000002 HC RX W HCPCS: Performed by: INTERNAL MEDICINE

## 2019-08-05 PROCEDURE — 70450 CT HEAD/BRAIN W/O DYE: CPT

## 2019-08-05 PROCEDURE — 77012 CT SCAN FOR NEEDLE BIOPSY: CPT

## 2019-08-05 PROCEDURE — 6370000000 HC RX 637 (ALT 250 FOR IP): Performed by: INTERNAL MEDICINE

## 2019-08-05 PROCEDURE — 88307 TISSUE EXAM BY PATHOLOGIST: CPT

## 2019-08-05 PROCEDURE — 2709999900 CT NEEDLE BIOPSY LIVER PERCUTANEOUS

## 2019-08-05 PROCEDURE — C9113 INJ PANTOPRAZOLE SODIUM, VIA: HCPCS | Performed by: INTERNAL MEDICINE

## 2019-08-05 PROCEDURE — 6360000002 HC RX W HCPCS: Performed by: RADIOLOGY

## 2019-08-05 PROCEDURE — 96372 THER/PROPH/DIAG INJ SC/IM: CPT

## 2019-08-05 PROCEDURE — 94761 N-INVAS EAR/PLS OXIMETRY MLT: CPT

## 2019-08-05 PROCEDURE — 94660 CPAP INITIATION&MGMT: CPT

## 2019-08-05 PROCEDURE — 92526 ORAL FUNCTION THERAPY: CPT

## 2019-08-05 PROCEDURE — 96376 TX/PRO/DX INJ SAME DRUG ADON: CPT

## 2019-08-05 PROCEDURE — 85610 PROTHROMBIN TIME: CPT

## 2019-08-05 PROCEDURE — 2700000000 HC OXYGEN THERAPY PER DAY

## 2019-08-05 PROCEDURE — 0FB23ZX EXCISION OF LEFT LOBE LIVER, PERCUTANEOUS APPROACH, DIAGNOSTIC: ICD-10-PCS | Performed by: RADIOLOGY

## 2019-08-05 PROCEDURE — 99232 SBSQ HOSP IP/OBS MODERATE 35: CPT | Performed by: INTERNAL MEDICINE

## 2019-08-05 PROCEDURE — 1200000000 HC SEMI PRIVATE

## 2019-08-05 PROCEDURE — 2580000003 HC RX 258: Performed by: INTERNAL MEDICINE

## 2019-08-05 PROCEDURE — 36415 COLL VENOUS BLD VENIPUNCTURE: CPT

## 2019-08-05 RX ORDER — FENTANYL CITRATE 50 UG/ML
INJECTION, SOLUTION INTRAMUSCULAR; INTRAVENOUS
Status: COMPLETED | OUTPATIENT
Start: 2019-08-05 | End: 2019-08-05

## 2019-08-05 RX ORDER — MIDAZOLAM HYDROCHLORIDE 5 MG/ML
INJECTION INTRAMUSCULAR; INTRAVENOUS
Status: COMPLETED | OUTPATIENT
Start: 2019-08-05 | End: 2019-08-05

## 2019-08-05 RX ADMIN — LAMOTRIGINE 25 MG: 25 TABLET ORAL at 10:01

## 2019-08-05 RX ADMIN — PANTOPRAZOLE SODIUM 40 MG: 40 INJECTION, POWDER, LYOPHILIZED, FOR SOLUTION INTRAVENOUS at 06:27

## 2019-08-05 RX ADMIN — ALPRAZOLAM 0.5 MG: 0.5 TABLET ORAL at 18:47

## 2019-08-05 RX ADMIN — SODIUM CHLORIDE: 9 INJECTION, SOLUTION INTRAVENOUS at 21:01

## 2019-08-05 RX ADMIN — SODIUM CHLORIDE: 9 INJECTION, SOLUTION INTRAVENOUS at 14:48

## 2019-08-05 RX ADMIN — MIDAZOLAM HYDROCHLORIDE 1 MG: 5 INJECTION INTRAMUSCULAR; INTRAVENOUS at 13:24

## 2019-08-05 RX ADMIN — ENOXAPARIN SODIUM 40 MG: 40 INJECTION SUBCUTANEOUS at 10:01

## 2019-08-05 RX ADMIN — FENTANYL CITRATE 100 MCG: 50 INJECTION INTRAMUSCULAR; INTRAVENOUS at 13:24

## 2019-08-05 RX ADMIN — Medication 10 ML: at 10:00

## 2019-08-05 RX ADMIN — HYDRALAZINE HYDROCHLORIDE 10 MG: 20 INJECTION INTRAMUSCULAR; INTRAVENOUS at 14:52

## 2019-08-06 LAB
GLUCOSE BLD-MCNC: 140 MG/DL (ref 70–99)
GLUCOSE BLD-MCNC: 77 MG/DL (ref 70–99)
GLUCOSE BLD-MCNC: 79 MG/DL (ref 70–99)
GLUCOSE BLD-MCNC: 85 MG/DL (ref 70–99)
INR BLD: 1.11 (ref 0.86–1.14)
PERFORMED ON: ABNORMAL
PERFORMED ON: NORMAL
PROTHROMBIN TIME: 12.7 SEC (ref 9.8–13)

## 2019-08-06 PROCEDURE — 96376 TX/PRO/DX INJ SAME DRUG ADON: CPT

## 2019-08-06 PROCEDURE — 36591 DRAW BLOOD OFF VENOUS DEVICE: CPT

## 2019-08-06 PROCEDURE — 90670 PCV13 VACCINE IM: CPT | Performed by: INTERNAL MEDICINE

## 2019-08-06 PROCEDURE — 1200000000 HC SEMI PRIVATE

## 2019-08-06 PROCEDURE — 6370000000 HC RX 637 (ALT 250 FOR IP): Performed by: INTERNAL MEDICINE

## 2019-08-06 PROCEDURE — 99232 SBSQ HOSP IP/OBS MODERATE 35: CPT | Performed by: INTERNAL MEDICINE

## 2019-08-06 PROCEDURE — 94660 CPAP INITIATION&MGMT: CPT

## 2019-08-06 PROCEDURE — 2580000003 HC RX 258: Performed by: INTERNAL MEDICINE

## 2019-08-06 PROCEDURE — 96372 THER/PROPH/DIAG INJ SC/IM: CPT

## 2019-08-06 PROCEDURE — 85610 PROTHROMBIN TIME: CPT

## 2019-08-06 PROCEDURE — C9113 INJ PANTOPRAZOLE SODIUM, VIA: HCPCS | Performed by: INTERNAL MEDICINE

## 2019-08-06 PROCEDURE — 6360000002 HC RX W HCPCS: Performed by: INTERNAL MEDICINE

## 2019-08-06 PROCEDURE — G0009 ADMIN PNEUMOCOCCAL VACCINE: HCPCS | Performed by: INTERNAL MEDICINE

## 2019-08-06 RX ORDER — NICOTINE POLACRILEX 4 MG
15 LOZENGE BUCCAL PRN
Status: DISCONTINUED | OUTPATIENT
Start: 2019-08-06 | End: 2019-08-07 | Stop reason: HOSPADM

## 2019-08-06 RX ORDER — DEXTROSE MONOHYDRATE 25 G/50ML
12.5 INJECTION, SOLUTION INTRAVENOUS PRN
Status: DISCONTINUED | OUTPATIENT
Start: 2019-08-06 | End: 2019-08-07 | Stop reason: HOSPADM

## 2019-08-06 RX ORDER — HYDROCHLOROTHIAZIDE 25 MG/1
25 TABLET ORAL EVERY MORNING
Status: DISCONTINUED | OUTPATIENT
Start: 2019-08-06 | End: 2019-08-07 | Stop reason: HOSPADM

## 2019-08-06 RX ORDER — DEXTROSE MONOHYDRATE 50 MG/ML
100 INJECTION, SOLUTION INTRAVENOUS PRN
Status: DISCONTINUED | OUTPATIENT
Start: 2019-08-06 | End: 2019-08-07 | Stop reason: HOSPADM

## 2019-08-06 RX ORDER — ASPIRIN 81 MG/1
81 TABLET, CHEWABLE ORAL DAILY
Status: DISCONTINUED | OUTPATIENT
Start: 2019-08-06 | End: 2019-08-07 | Stop reason: HOSPADM

## 2019-08-06 RX ADMIN — Medication 10 ML: at 09:53

## 2019-08-06 RX ADMIN — PNEUMOCOCCAL 13-VALENT CONJUGATE VACCINE 0.5 ML: 2.2; 2.2; 2.2; 2.2; 2.2; 4.4; 2.2; 2.2; 2.2; 2.2; 2.2; 2.2; 2.2 INJECTION, SUSPENSION INTRAMUSCULAR at 09:49

## 2019-08-06 RX ADMIN — HYDROCHLOROTHIAZIDE 25 MG: 25 TABLET ORAL at 09:49

## 2019-08-06 RX ADMIN — ALPRAZOLAM 0.5 MG: 0.5 TABLET ORAL at 09:49

## 2019-08-06 RX ADMIN — PANTOPRAZOLE SODIUM 40 MG: 40 INJECTION, POWDER, LYOPHILIZED, FOR SOLUTION INTRAVENOUS at 06:26

## 2019-08-06 RX ADMIN — ASPIRIN 81 MG 81 MG: 81 TABLET ORAL at 09:49

## 2019-08-06 RX ADMIN — LAMOTRIGINE 25 MG: 25 TABLET ORAL at 09:49

## 2019-08-06 RX ADMIN — ENOXAPARIN SODIUM 40 MG: 40 INJECTION SUBCUTANEOUS at 09:49

## 2019-08-06 RX ADMIN — ALPRAZOLAM 0.5 MG: 0.5 TABLET ORAL at 20:32

## 2019-08-07 VITALS
HEART RATE: 68 BPM | SYSTOLIC BLOOD PRESSURE: 137 MMHG | WEIGHT: 237.44 LBS | DIASTOLIC BLOOD PRESSURE: 87 MMHG | BODY MASS INDEX: 33.24 KG/M2 | OXYGEN SATURATION: 95 % | TEMPERATURE: 96.6 F | HEIGHT: 71 IN | RESPIRATION RATE: 18 BRPM

## 2019-08-07 LAB
ANION GAP SERPL CALCULATED.3IONS-SCNC: 9 MMOL/L (ref 3–16)
BUN BLDV-MCNC: 17 MG/DL (ref 7–20)
CA 19-9: 1969 U/ML (ref 0–35)
CALCIUM SERPL-MCNC: 9 MG/DL (ref 8.3–10.6)
CHLORIDE BLD-SCNC: 100 MMOL/L (ref 99–110)
CO2: 30 MMOL/L (ref 21–32)
CREAT SERPL-MCNC: 1.5 MG/DL (ref 0.8–1.3)
GFR AFRICAN AMERICAN: 56
GFR NON-AFRICAN AMERICAN: 46
GLUCOSE BLD-MCNC: 84 MG/DL (ref 70–99)
GLUCOSE BLD-MCNC: 90 MG/DL (ref 70–99)
GLUCOSE BLD-MCNC: 91 MG/DL (ref 70–99)
HCT VFR BLD CALC: 43 % (ref 40.5–52.5)
HEMOGLOBIN: 14.1 G/DL (ref 13.5–17.5)
MCH RBC QN AUTO: 28.7 PG (ref 26–34)
MCHC RBC AUTO-ENTMCNC: 32.8 G/DL (ref 31–36)
MCV RBC AUTO: 87.4 FL (ref 80–100)
PDW BLD-RTO: 15.9 % (ref 12.4–15.4)
PERFORMED ON: NORMAL
PERFORMED ON: NORMAL
PLATELET # BLD: 198 K/UL (ref 135–450)
PMV BLD AUTO: 8.8 FL (ref 5–10.5)
POTASSIUM SERPL-SCNC: 3.4 MMOL/L (ref 3.5–5.1)
RBC # BLD: 4.92 M/UL (ref 4.2–5.9)
SODIUM BLD-SCNC: 139 MMOL/L (ref 136–145)
WBC # BLD: 8.5 K/UL (ref 4–11)

## 2019-08-07 PROCEDURE — 6370000000 HC RX 637 (ALT 250 FOR IP): Performed by: INTERNAL MEDICINE

## 2019-08-07 PROCEDURE — 99239 HOSP IP/OBS DSCHRG MGMT >30: CPT | Performed by: INTERNAL MEDICINE

## 2019-08-07 PROCEDURE — 80048 BASIC METABOLIC PNL TOTAL CA: CPT

## 2019-08-07 PROCEDURE — 6360000002 HC RX W HCPCS: Performed by: INTERNAL MEDICINE

## 2019-08-07 PROCEDURE — 2580000003 HC RX 258: Performed by: INTERNAL MEDICINE

## 2019-08-07 PROCEDURE — 96372 THER/PROPH/DIAG INJ SC/IM: CPT

## 2019-08-07 PROCEDURE — 85027 COMPLETE CBC AUTOMATED: CPT

## 2019-08-07 PROCEDURE — 94660 CPAP INITIATION&MGMT: CPT

## 2019-08-07 RX ADMIN — Medication 10 ML: at 08:59

## 2019-08-07 RX ADMIN — ASPIRIN 81 MG 81 MG: 81 TABLET ORAL at 08:59

## 2019-08-07 RX ADMIN — LAMOTRIGINE 25 MG: 25 TABLET ORAL at 08:59

## 2019-08-07 RX ADMIN — ALPRAZOLAM 0.5 MG: 0.5 TABLET ORAL at 08:59

## 2019-08-07 RX ADMIN — HYDROCHLOROTHIAZIDE 25 MG: 25 TABLET ORAL at 08:59

## 2019-08-07 RX ADMIN — ENOXAPARIN SODIUM 40 MG: 40 INJECTION SUBCUTANEOUS at 08:58

## 2019-08-07 ASSESSMENT — PAIN SCALES - GENERAL: PAINLEVEL_OUTOF10: 0

## 2019-08-25 ENCOUNTER — APPOINTMENT (OUTPATIENT)
Dept: GENERAL RADIOLOGY | Age: 69
End: 2019-08-25
Payer: MEDICARE

## 2019-08-25 ENCOUNTER — HOSPITAL ENCOUNTER (EMERGENCY)
Age: 69
Discharge: HOME | End: 2019-08-25
Attending: EMERGENCY MEDICINE
Payer: MEDICARE

## 2019-08-25 VITALS
RESPIRATION RATE: 18 BRPM | WEIGHT: 237 LBS | TEMPERATURE: 97.7 F | HEART RATE: 73 BPM | BODY MASS INDEX: 33.05 KG/M2 | OXYGEN SATURATION: 96 % | SYSTOLIC BLOOD PRESSURE: 212 MMHG | DIASTOLIC BLOOD PRESSURE: 96 MMHG

## 2019-08-25 LAB
A/G RATIO: 0.8 (ref 1.1–2.2)
ALBUMIN SERPL-MCNC: 3.5 G/DL (ref 3.4–5)
ALP BLD-CCNC: 344 U/L (ref 40–129)
ALT SERPL-CCNC: 13 U/L (ref 10–40)
ANION GAP SERPL CALCULATED.3IONS-SCNC: 18 MMOL/L (ref 3–16)
AST SERPL-CCNC: 33 U/L (ref 15–37)
BASOPHILS ABSOLUTE: 0.1 K/UL (ref 0–0.2)
BASOPHILS RELATIVE PERCENT: 0.5 %
BILIRUB SERPL-MCNC: 1.1 MG/DL (ref 0–1)
BUN BLDV-MCNC: 32 MG/DL (ref 7–20)
CALCIUM SERPL-MCNC: 9.7 MG/DL (ref 8.3–10.6)
CHLORIDE BLD-SCNC: 90 MMOL/L (ref 99–110)
CO2: 28 MMOL/L (ref 21–32)
CREAT SERPL-MCNC: 1.8 MG/DL (ref 0.8–1.3)
EOSINOPHILS ABSOLUTE: 0.1 K/UL (ref 0–0.6)
EOSINOPHILS RELATIVE PERCENT: 0.7 %
GFR AFRICAN AMERICAN: 45
GFR NON-AFRICAN AMERICAN: 37
GLOBULIN: 4.3 G/DL
GLUCOSE BLD-MCNC: 82 MG/DL (ref 70–99)
HCT VFR BLD CALC: 53.7 % (ref 40.5–52.5)
HEMOGLOBIN: 17.7 G/DL (ref 13.5–17.5)
LIPASE: 54 U/L (ref 13–60)
LYMPHOCYTES ABSOLUTE: 1.5 K/UL (ref 1–5.1)
LYMPHOCYTES RELATIVE PERCENT: 11.7 %
MAGNESIUM: 2.3 MG/DL (ref 1.8–2.4)
MCH RBC QN AUTO: 28.6 PG (ref 26–34)
MCHC RBC AUTO-ENTMCNC: 32.9 G/DL (ref 31–36)
MCV RBC AUTO: 86.8 FL (ref 80–100)
MONOCYTES ABSOLUTE: 0.8 K/UL (ref 0–1.3)
MONOCYTES RELATIVE PERCENT: 6.2 %
NEUTROPHILS ABSOLUTE: 10.5 K/UL (ref 1.7–7.7)
NEUTROPHILS RELATIVE PERCENT: 80.9 %
PDW BLD-RTO: 16.5 % (ref 12.4–15.4)
PLATELET # BLD: 258 K/UL (ref 135–450)
PMV BLD AUTO: 9.1 FL (ref 5–10.5)
POTASSIUM SERPL-SCNC: 3.8 MMOL/L (ref 3.5–5.1)
RBC # BLD: 6.19 M/UL (ref 4.2–5.9)
SODIUM BLD-SCNC: 136 MMOL/L (ref 136–145)
TOTAL PROTEIN: 7.8 G/DL (ref 6.4–8.2)
WBC # BLD: 13 K/UL (ref 4–11)

## 2019-08-25 PROCEDURE — 80053 COMPREHEN METABOLIC PANEL: CPT

## 2019-08-25 PROCEDURE — 71045 X-RAY EXAM CHEST 1 VIEW: CPT

## 2019-08-25 PROCEDURE — 96361 HYDRATE IV INFUSION ADD-ON: CPT

## 2019-08-25 PROCEDURE — 83735 ASSAY OF MAGNESIUM: CPT

## 2019-08-25 PROCEDURE — 6360000002 HC RX W HCPCS: Performed by: PHYSICIAN ASSISTANT

## 2019-08-25 PROCEDURE — 96374 THER/PROPH/DIAG INJ IV PUSH: CPT

## 2019-08-25 PROCEDURE — 83690 ASSAY OF LIPASE: CPT

## 2019-08-25 PROCEDURE — 74019 RADEX ABDOMEN 2 VIEWS: CPT

## 2019-08-25 PROCEDURE — 85025 COMPLETE CBC W/AUTO DIFF WBC: CPT

## 2019-08-25 PROCEDURE — 2580000003 HC RX 258: Performed by: PHYSICIAN ASSISTANT

## 2019-08-25 PROCEDURE — 93005 ELECTROCARDIOGRAM TRACING: CPT | Performed by: PHYSICIAN ASSISTANT

## 2019-08-25 PROCEDURE — 99284 EMERGENCY DEPT VISIT MOD MDM: CPT

## 2019-08-25 RX ORDER — CARVEDILOL 25 MG/1
25 TABLET ORAL 2 TIMES DAILY WITH MEALS
COMMUNITY

## 2019-08-25 RX ORDER — 0.9 % SODIUM CHLORIDE 0.9 %
500 INTRAVENOUS SOLUTION INTRAVENOUS ONCE
Status: COMPLETED | OUTPATIENT
Start: 2019-08-25 | End: 2019-08-25

## 2019-08-25 RX ORDER — ONDANSETRON 4 MG/1
4 TABLET, ORALLY DISINTEGRATING ORAL EVERY 8 HOURS PRN
Qty: 30 TABLET | Refills: 1 | Status: SHIPPED | OUTPATIENT
Start: 2019-08-25

## 2019-08-25 RX ORDER — HYDRALAZINE HYDROCHLORIDE 50 MG/1
50 TABLET, FILM COATED ORAL 3 TIMES DAILY
COMMUNITY

## 2019-08-25 RX ORDER — ONDANSETRON 2 MG/ML
4 INJECTION INTRAMUSCULAR; INTRAVENOUS ONCE
Status: COMPLETED | OUTPATIENT
Start: 2019-08-25 | End: 2019-08-25

## 2019-08-25 RX ORDER — AMLODIPINE BESYLATE 10 MG/1
10 TABLET ORAL DAILY
COMMUNITY

## 2019-08-25 RX ADMIN — SODIUM CHLORIDE 500 ML: 9 INJECTION, SOLUTION INTRAVENOUS at 18:58

## 2019-08-25 RX ADMIN — ONDANSETRON 4 MG: 2 INJECTION INTRAMUSCULAR; INTRAVENOUS at 19:13

## 2019-08-25 ASSESSMENT — PAIN SCALES - GENERAL
PAINLEVEL_OUTOF10: 0
PAINLEVEL_OUTOF10: 4

## 2019-08-25 NOTE — ED NOTES
PO challenge- provided water and pudding per patient request. Patient sitting at bedside, friend @ bedside.       Mary Acuna RN  08/25/19 [id number]

## 2019-08-25 NOTE — ED NOTES
Patient attempting to urinate. Refuses catheter. MAYITO Goetz notified.       Thierno Jackson RN  08/25/19 [id number]

## 2019-08-25 NOTE — ED PROVIDER NOTES
[facility] Emergency Department    CHIEF COMPLAINT  Emesis (throwing up since june, )      HISTORY OF PRESENT ILLNESS  Rodrigue Das is a 68 y.o. male who presents to the ED complaining of spitting up phlegm. Patient observed lying in bed, appears nontoxic and in no acute distress at this time. Patient accompanied by friend today for evaluation. Friend reports that patient went through divorce several months ago of marriage of 46 years. Reports that children no longer want anything to do with him and he has been dealing with some depression. Had a psychiatric evaluation several months ago in [facility].  Reports that since then he has been unable to eat and drink. States that they lost his CPAP machine and he has had shortness of breath and difficulty breathing. No chest pain. No headache, lightheadedness, dizziness or confusion. Reports that after he drinks water. Vomits approximately 10 minutes after. No abdominal pain. On denies fevers or chills. No leg pain or swelling. History of metastatic cancer. No other complaints, modifying factors or associated symptoms. Nursing notes reviewed.    Past Medical History:   Diagnosis Date    Abdominal aneurysm ([address]     Arthritis     ASHD (arteriosclerotic heart disease) 9/8/2010    Asthma exacerbation in COPD ([address] 5/7/2012    CKD (chronic kidney disease) 9/8/2010    Class 2 severe obesity due to excess calories with serious comorbidity and body mass index (BMI) of 39.0 to 39.9 in adult [facility]) 2/27/2019    Colon cancer ([address] 12/12/2013    COPD (chronic obstructive pulmonary disease) ([address]     Depression 12/12/2013    Diabetes mellitus ([address]     Hyperlipidemia     Hypertension     Metastatic colon cancer to liver ([address]     WINSTON (obstructive sleep apnea) 9/8/2010    PVD (peripheral vascular disease) ([address] 2/28/2011    TIA (transient ischemic attack)      Past Surgical History:   Procedure Laterality Date    CATARACT REMOVAL WITH IMPLANT Right 5/15/2015    CATARACT REMOVAL WITH IMPLANT Left 2018    COLON SURGERY      CORONARY ARTERY BYPASS GRAFT      3 vessel    OTHER SURGICAL HISTORY      open heart surgery    OTHER SURGICAL HISTORY      port     History reviewed. No pertinent family history. Social History     Socioeconomic History    Marital status:      Spouse name: Not on file    Number of children: Not on file    Years of education: Not on file    Highest education level: Not on file   Occupational History    Not on file   Social Needs    Financial resource strain: Not on file    Food insecurity:     Worry: Not on file     Inability: Not on file    Transportation needs:     Medical: Not on file     Non-medical: Not on file   Tobacco Use    Smoking status: Former Smoker     Packs/day: 1.00     Types: Cigarettes     Start date: 1960     Last attempt to quit: 2001     Years since quittin.6    Smokeless tobacco: Never Used   Substance and Sexual Activity    Alcohol use: No    Drug use: No    Sexual activity: Yes     Partners: Female   Lifestyle    Physical activity:     Days per week: Not on file     Minutes per session: Not on file    Stress: Not on file   Relationships    Social connections:     Talks on phone: Not on file     Gets together: Not on file     Attends Hinduism service: Not on file     Active member of club or organization: Not on file     Attends meetings of clubs or organizations: Not on file     Relationship status: Not on file    Intimate partner violence:     Fear of current or ex partner: Not on file     Emotionally abused: Not on file     Physically abused: Not on file     Forced sexual activity: Not on file   Other Topics Concern    Not on file   Social History Narrative    Not on file     No current facility-administered medications for this encounter.       Current Outpatient Medications   Medication Sig Dispense Refill    lamoTRIgine of intravenous contrast. Multiplanar reformatted images are provided for review. Dose modulation, iterative reconstruction, and/or weight based adjustment of the mA/kV was utilized to reduce the radiation dose to as low as reasonably achievable. COMPARISON: No recent prior for comparison. HISTORY: ORDERING SYSTEM PROVIDED HISTORY: n/v weight loss x 2 months, history of colon CA TECHNOLOGIST PROVIDED HISTORY: Additional Contrast?->None Reason for Exam: weight loss, hx of colon ca Acuity: Acute Type of Exam: Initial FINDINGS: Lower Chest: Multiple noncalcified pulmonary nodules are seen at the lung bases, for example measuring up to approximately 9 mm within the right lower lobe. Organs: Lack of IV and oral contrast limits sensitivity for visceral organ pathology. Ill-defined low-density lesions are seen within the liver, with greatest confluence in the right hepatic lobe, measuring 13 x 9.9 cm for example. Cholelithiasis. Spleen is within normal limits. Stomach is grossly unremarkable. No pancreatic stranding. 2.6 x 1.9 cm left adrenal nodule, Hounsfield units 37, technically indeterminate. Low-density nodularity of the right adrenal gland, favoring right adrenal adenomas. Coarse calcification within the mid left kidney. Low-density lesions are seen within each kidney which are incompletely characterized without contrast.  No hydronephrosis. Calcification of the abdominal aorta. Right renal artery stent. Proximal abdominal aorta measures approximately 4.4 x 4.5 cm. An additional aneurysmal component within the mid to distal abdominal aorta measures 4.8 x 4.7 cm. Bilateral iliac artery calcification. GI/Bowel: Diverticulosis. Minimal induration of fat adjacent to the sigmoid colon. Postoperative change of the right colon. Appendix is not visualized. Small bowel is nondilated. Pelvis: Bladder is incompletely distended. Pelvic phleboliths. Calcification of prostate. No inguinal adenopathy. HISTORY: Reason for Exam: hx of colon cancer with new findings on ABD/Pelvis CT Acuity: Acute Type of Exam: Initial Multiple pulmonary nodules seen on recent abdominal CT study, history of colon cancer. FINDINGS: Mediastinum: There is a 13 mm rim calcified lesion within the right thyroid lobe, for which no further follow-up is recommended. There is no pathologically enlarged lymphadenopathy within the chest or mediastinum. There are post CABG changes evident. There is atherosclerotic disease of the thoracic aorta with a 4.1 cm fusiform ectasia of the ascending aorta. There is native coronary atherosclerosis. No pericardial abnormality is identified. There is four-chamber cardiac enlargement. Lungs/pleura: There are mild retained mucus secretions within the lower thoracic trachea. The central airways are otherwise patent. There is mild dependent atelectasis within the bilateral lung bases. There is mild pleural-parenchymal scarring within the right middle lobe. The lungs are otherwise clear, without evidence of acute airspace consolidation. There is no evidence of a pneumothorax or pleural effusion. There are multiple scattered bibasilar predominant pulmonary nodules throughout both lungs, with the largest measuring approximately 13 x 9 mm within the left upper lobe, image 31 of series 2. Additional smaller noncalcified nodules are also noted. These are highly concerning for intrapulmonary metastatic disease. Upper Abdomen: There are multiple scattered various sized low-attenuation lesions throughout the liver parenchyma, highly concerning for intrahepatic metastatic disease, fully described on the study of 08/03/2019. There is a partially visualized left adrenal lesion, concerning for metastatic disease. The remainder of the upper abdomen is unremarkable. Soft Tissues/Bones: There is diffuse idiopathic skeletal hyperostosis. There are chronic left rib fractures.   No osteolytic or osteoblastic lesion is seen. A right-sided Port-A-Cath is in place. 1. No acute airspace consolidation. 2. Multiple scattered various sized pulmonary nodules throughout both lungs are highly concerning for intrapulmonary metastatic disease. The largest of these measures 13 x 9 mm within the left upper lobe. Suggest further characterization with a PET-CT study and/or tissue sampling. 3. 4.1 cm fusiform ectasia of the ascending aorta. 4. Re-demonstration of intrahepatic and left adrenal metastatic disease. Ct Guided Needle Placement    Result Date: 8/5/2019  PROCEDURE: CT NEEDLE BIOPSY LIVER PERCUTANEOUS; CT GUIDED NDL PLACEMENT MODERATE CONSCIOUS SEDATION 8/5/2019 HISTORY: ORDERING SYSTEM PROVIDED HISTORY: liver lesion - had colon cancer in 2013. Unusual for a relapse 6 years later. Late relapse vs new primary? TECHNOLOGIST PROVIDED HISTORY: Reason for exam:->liver lesion - had colon cancer in 2013. Unusual for a relapse 6 years later. Late relapse vs new primary? Reason for Exam: mass; ORDERING SYSTEM PROVIDED HISTORY: mass TECHNOLOGIST PROVIDED HISTORY: Reason for exam:->mass TECHNIQUE: Dose modulation, iterative reconstruction, and/or weight based adjustment of the mA/kV was utilized to reduce the radiation dose to as low as reasonably achievable. CONTRAST: Contrast was not used for the procedure. SEDATION: A total of 1 mg of Versed and 100 mcg of fentanyl was utilized for the procedure, with total sedation time of 36 minutes FLUOROSCOPY DOSE AND TYPE OR TIME AND EXPOSURES: The total exam dose linear product was 983.10 mGy per cm DESCRIPTION OF PROCEDURE: Informed consent was obtained after a detailed explanation of the procedure including risks, benefits, and alternatives. Universal protocol was observed. With the patient in a left side down/right side up decubitus position, an initial  CT was performed for lesion localization.   A large heterogeneous primarily low-attenuation mass is noted within the right hepatic lobe. An adequate site along the patient's right upper quadrant was marked for percutaneous entry into the patient's mass, and the right upper quadrant was prepped and draped in normal sterile fashion. Using lidocaine local anesthetic and sequential CT guidance, a 17 gauge coaxial introducer needle was advanced into the liver lesion. A subsequent 18 gauge core biopsy specimen was obtained using a BioPince device, deemed adequate, and placed in formalin. The needle was removed, and hemostasis was achieved using digital pressure. The site was cleaned, and a dry sterile dressing was applied. The patient tolerated the procedure well with no immediate complications. FINDINGS: CT imaging demonstrating a large heterogeneous mass right within the right hepatic lobe. Intraprocedural images demonstrating the tip of a coaxial needle within the mass. Technically successful CT-guided biopsy of right hepatic lobe lesion, as detailed above. Ct Needle Biopsy Liver Percutaneous    Result Date: 8/5/2019  PROCEDURE: CT NEEDLE BIOPSY LIVER PERCUTANEOUS; CT GUIDED NDL PLACEMENT MODERATE CONSCIOUS SEDATION 8/5/2019 HISTORY: ORDERING SYSTEM PROVIDED HISTORY: liver lesion - had colon cancer in 2013. Unusual for a relapse 6 years later. Late relapse vs new primary? TECHNOLOGIST PROVIDED HISTORY: Reason for exam:->liver lesion - had colon cancer in 2013. Unusual for a relapse 6 years later. Late relapse vs new primary? Reason for Exam: mass; ORDERING SYSTEM PROVIDED HISTORY: mass TECHNOLOGIST PROVIDED HISTORY: Reason for exam:->mass TECHNIQUE: Dose modulation, iterative reconstruction, and/or weight based adjustment of the mA/kV was utilized to reduce the radiation dose to as low as reasonably achievable. CONTRAST: Contrast was not used for the procedure.  SEDATION: A total of 1 mg of Versed and 100 mcg of fentanyl was utilized for the procedure, with total sedation time of 36 minutes FLUOROSCOPY DOSE AND TYPE OR Acute abdominal series also demonstrating no acute pathologic process. Lipase normal.  Magnesium 2.3. EKG normal sinus rhythm with PVCs noted. Patient's given dose of Zofran. Is tolerating p.o. challenge. Will be discharged home with antiemetics and recommendations for close and continue PCP follow-up. Have also discussed return precautions and patient and friend are in agreement and comfortable at discharge. A discussion was had with Mr. Malena Langford regarding decreased appetite, nausea, ED findings and recommendations for follow-up. All questions were answered. Patient will follow up with PCP within 2 to 3 days for further evaluation/treatment. Patient will return to ED for new/worsening symptoms. Patient was sent home with a prescription for Zofran.     MDM  Results for orders placed or performed during the hospital encounter of 08/25/19   CBC Auto Differential   Result Value Ref Range    WBC 13.0 (H) 4.0 - 11.0 K/uL    RBC 6.19 (H) 4.20 - 5.90 M/uL    Hemoglobin 17.7 (H) 13.5 - 17.5 g/dL    Hematocrit 53.7 (H) 40.5 - 52.5 %    MCV 86.8 80.0 - 100.0 fL    MCH 28.6 26.0 - 34.0 pg    MCHC 32.9 31.0 - 36.0 g/dL    RDW 16.5 (H) 12.4 - 15.4 %    Platelets 089 361 - 865 K/uL    MPV 9.1 5.0 - 10.5 fL    Neutrophils % 80.9 %    Lymphocytes % 11.7 %    Monocytes % 6.2 %    Eosinophils % 0.7 %    Basophils % 0.5 %    Neutrophils Absolute 10.5 (H) 1.7 - 7.7 K/uL    Lymphocytes Absolute 1.5 1.0 - 5.1 K/uL    Monocytes Absolute 0.8 0.0 - 1.3 K/uL    Eosinophils Absolute 0.1 0.0 - 0.6 K/uL    Basophils Absolute 0.1 0.0 - 0.2 K/uL   Comprehensive Metabolic Panel   Result Value Ref Range    Sodium 136 136 - 145 mmol/L    Potassium 3.8 3.5 - 5.1 mmol/L    Chloride 90 (L) 99 - 110 mmol/L    CO2 28 21 - 32 mmol/L    Anion Gap 18 (H) 3 - 16    Glucose 82 70 - 99 mg/dL    BUN 32 (H) 7 - 20 mg/dL    CREATININE 1.8 (H) 0.8 - 1.3 mg/dL    GFR Non- 37 (A) >60    GFR  45 (A) >60    Calcium 9.7 8.3 - 10.6 mg/dL    Total Protein 7.8 6.4 - 8.2 g/dL    Alb 3.5 3.4 - 5.0 g/dL    Albumin/Globulin Ratio 0.8 (L) 1.1 - 2.2    Total Bilirubin 1.1 (H) 0.0 - 1.0 mg/dL    Alkaline Phosphatase 344 (H) 40 - 129 U/L    ALT 13 10 - 40 U/L    AST 33 15 - 37 U/L    Globulin 4.3 g/dL   Lipase   Result Value Ref Range    Lipase 54.0 13.0 - 60.0 U/L   Magnesium   Result Value Ref Range    Magnesium 2.30 1.80 - 2.40 mg/dL   EKG 12 Lead   Result Value Ref Range    Ventricular Rate 74 BPM    Atrial Rate 74 BPM    P-R Interval 174 ms    QRS Duration 118 ms    Q-T Interval 444 ms    QTc Calculation (Bazett) 492 ms    P Axis -24 degrees    R Axis 78 degrees    T Axis 84 degrees    Diagnosis       Sinus rhythm with occasional Premature ventricular complexesNon-specific intra-ventricular conduction delayProlonged QTAbnormal ECGWhen compared with ECG of 03-AUG-2019 12:44,Premature ventricular complexes are now PresentLeft posterior fascicular block is no longer Present     I estimate there is LOW risk for ACUTE APPENDICITIS, BOWEL OBSTRUCTION, CHOLECYSTITIS, DIVERTICULITIS, INCARCERATED HERNIA, PANCREATITIS, or PERFORATED BOWEL or ULCER, thus I consider the discharge disposition reasonable. Also, there is no evidence or peritonitis, sepsis, or toxicity. Phil Muñoz and I have discussed the diagnosis and risks, and we agree with discharging home to follow-up with their primary doctor. We also discussed returning to the Emergency Department immediately if new or worsening symptoms occur. We have discussed the symptoms which are most concerning (e.g., bloody stool, fever, changing or worsening pain, vomiting) that necessitate immediate return. FINAL Impression  1. Decreased appetite    2. Cough    3. Nausea      Blood pressure (!) 146/116, pulse 89, temperature 97.7 °F (36.5 °C), temperature source Oral, resp. rate 16, weight 237 lb (107.5 kg), SpO2 95 %. DISPOSITION  Patient was discharged to home in good condition.           Shahid Ackerman [place], [place]  08/25/19 [id number]

## 2019-08-26 LAB
EKG ATRIAL RATE: 74 BPM
EKG DIAGNOSIS: NORMAL
EKG P AXIS: -24 DEGREES
EKG P-R INTERVAL: 174 MS
EKG Q-T INTERVAL: 444 MS
EKG QRS DURATION: 118 MS
EKG QTC CALCULATION (BAZETT): 492 MS
EKG R AXIS: 78 DEGREES
EKG T AXIS: 84 DEGREES
EKG VENTRICULAR RATE: 74 BPM

## 2019-08-26 PROCEDURE — 93010 ELECTROCARDIOGRAM REPORT: CPT | Performed by: INTERNAL MEDICINE

## 2019-08-26 NOTE — ED PROVIDER NOTES
Emergency Department Provider Note     Location: [address] [place] [address]  ED  8/25/2019    I independently performed a history and physical on Rodrigue Das. All diagnostic, treatment, and disposition decisions were made by myself in conjunction with the mid-level provider. Briefly, this is a 68 y.o. male here for nausea. Patient reports nausea that has been ongoing since June. He has poor appetite and often does not eat or drink. He said he vomits occasionally. He does not feel well. Later on, I then found out patient has had a lot of social stress including a divorce several months ago after being  for 51 years. His children is also not involved in his life. Denies SI/HI. ED Triage Vitals [08/25/19 1520]   BP Temp Temp Source Pulse Resp SpO2 Height Weight   (!) 156/96 97.7 °F (36.5 °C) Oral 83 18 95 % -- 237 lb (107.5 kg)        Exam showed WDWN male NAD, ACOx3, heart RRR, lungs clear, abdomen soft, normal BS. Patient seen and examined in room 8. EKG  The Ekg interpreted by me in the absence of a cardiologist shows. normal sinus rhythm with a rate of 74  Axis is   Normal  QTc is  prolonged  PVC noted     No specific ST-T wave changes appreciated. No evidence of acute ischemia. No significant change from prior EKG dated 8/3/19, PVC noted today but otherwise no significant changes      Radiology  Xr Chest Portable    Result Date: 8/25/2019  EXAMINATION: ONE XRAY VIEW OF THE CHEST 8/25/2019 4:25 pm COMPARISON: 08/03/2019 HISTORY: ORDERING SYSTEM PROVIDED HISTORY: cough TECHNOLOGIST PROVIDED HISTORY: Reason for exam:->cough Reason for Exam: Emesis (throwing up since june, ) FINDINGS: Status post median sternotomy. Right-sided central venous catheter remains in place terminating in the SVC. The lungs are without acute focal process. There is no effusion or pneumothorax. The cardiomediastinal silhouette is stable. The osseous structures are stable. No acute process.      Claudio Zurita Abdomen (2 Views)    Result Date: 8/25/2019  EXAMINATION: TWO XRAY VIEWS OF THE ABDOMEN 8/25/2019 7:40 pm COMPARISON: CT abdomen and pelvis 08/03/2019 HISTORY: Acute mid abdominal pain. FINDINGS: Lower chest demonstrates multifocal median sternotomy wire fractures and cardiac silhouette enlargement. Unremarkable bowel gas pattern. No unusual abdominal or pelvic soft tissue or calcific density is seen. Visualized osseous structures appear unremarkable. No pneumoperitoneum on the upright view. Unremarkable upright and supine AP abdomen. Lab reviewed. Pertinent for Cr 1.8 (baseline 1.5), BUN 32 (prior 17). Alk phos elevated but that is baseline. CBC showed WBC 13,000 and hemoglobin/hematocrit 17/53. Likely hemoconcentrated given poor oral intake and elevated BUN as well. Patient given a dose of Zofran reported felt much better. He tolerated p.o. challenge without any difficulty. X-ray did not show any evidence of obstruction or ileus. Patient also reports having bowel movement and unlikely to have SBO. This is also chronic in nature. Safe to DC. Outpatient follow-up with PCP. Prior to discharge, blood pressure noted to be elevated in the 200 range. Patient missed his evening dose of blood pressure meds. He is otherwise asymptomatic and denies chest pain or headache. He had his medicine with him so I instructed him to take a dose of his home meds. For further details of [facility] emergency department encounter, please see MAYITO Thacker's documentation. This chart was generated in part by using Dragon Dictation system and may contain errors related to that system including errors in grammar, punctuation, and spelling, as well as words and phrases that may be inappropriate. If there are any questions or concerns please feel free to contact the dictating provider for clarification.           Crystal Banegas MD  08/25/19 [id number]

## 2019-08-26 NOTE — ED NOTES
Discussed POC with MAYITO Youssef and Dr. Henrique Moura. Patient states he wants to stay in hospital. Lakisha Campos to talk with patient.      Brittani Mabry RN  08/25/19 [id number]

## 2019-08-26 NOTE — ED NOTES
Patient still unable to provide urine specimen. MAYITO Rodriguez aware.       Emerald Pace RN  08/25/19 [id number]

## 2019-09-05 ENCOUNTER — TELEPHONE (OUTPATIENT)
Dept: PULMONOLOGY | Age: 69
End: 2019-09-05

## 2022-01-03 NOTE — H&P
Called patient to inform them that  request is being routed. Please follow up with patient.   Update to the History and Physical    There have been no changes to the patient's past medical or surgical history since the pre-operative history and physical.  The patient has not been experiencing any new or worsening symptoms.     Malorie Ling